# Patient Record
Sex: FEMALE | Race: WHITE | NOT HISPANIC OR LATINO | Employment: FULL TIME | URBAN - METROPOLITAN AREA
[De-identification: names, ages, dates, MRNs, and addresses within clinical notes are randomized per-mention and may not be internally consistent; named-entity substitution may affect disease eponyms.]

---

## 2017-01-16 ENCOUNTER — OFFICE VISIT (OUTPATIENT)
Dept: OBSTETRICS AND GYNECOLOGY | Facility: CLINIC | Age: 42
End: 2017-01-16

## 2017-01-16 VITALS — DIASTOLIC BLOOD PRESSURE: 84 MMHG | WEIGHT: 198 LBS | SYSTOLIC BLOOD PRESSURE: 124 MMHG

## 2017-01-16 DIAGNOSIS — R87.810 ASCUS WITH POSITIVE HIGH RISK HPV CERVICAL: Primary | ICD-10-CM

## 2017-01-16 DIAGNOSIS — R87.610 ASCUS WITH POSITIVE HIGH RISK HPV CERVICAL: Primary | ICD-10-CM

## 2017-01-16 PROCEDURE — 57452 EXAM OF CERVIX W/SCOPE: CPT | Performed by: OBSTETRICS & GYNECOLOGY

## 2017-01-16 NOTE — MR AVS SNAPSHOT
Leanna Benoit   1/16/2017 3:10 PM   Office Visit    Dept Phone:  [phone]   Encounter #:  [id number]    Provider:  Malik Haji MD   Department:  [facility] OBSTETRICS AND GYNECOLOGY                Your Full Care Plan              Your Updated Medication List          This list is accurate as of: 1/16/17  3:46 PM.  Always use your most recent med list.                levonorgestrel 20 MCG/24HR IUD   Commonly known as:  MIRENA               You Were Diagnosed With        Codes Comments    ASCUS with positive high risk HPV cervical    -  Primary ICD-10-CM: R87.610, R87.810  ICD-9-CM: 795.01, 795.05       Instructions     None    Patient Instructions History      Upcoming Appointments     Visit Type Date Time Department    [facility] 1[facility]16[facility]2017  3[facility]    ANNUAL 12/4/2017  9:00 AM [facility]      MyChart Signup     Our records indicate that you have declined [facility] sign[facility]. If you would like to sign up for [email or web address], please email [email or web address] or call [phone] to obtain an activation code.             Other Info from Your Visit           Your Appointments     Dec 04, 2017  9:00 AM EST   Annual with Malik Haji MD   [facility] OBSTETRICS AND GYNECOLOGY (--)    [address] [place] [id number] [phone]              Allergies     No Known Allergies      Reason for Visit     Colposcopy No concern this visit.      Vital Signs     Blood Pressure Weight Smoking Status             124/84 198 lb (89.8 kg) Never Smoker         Problems and Diagnoses Noted     ASCUS with positive high risk HPV cervical

## 2017-01-16 NOTE — PROGRESS NOTES
Procedure   Procedures       Physical Exam    Colposcopy Procedure Note    Indications: Pap smear 2 months ago showed: ASCUS with POSITIVE high risk HPV. The prior pap showed ASCUS with POSITIVE high risk HPV.  Prior cervical/vaginal disease: normal exam without visible pathology. Prior cervical treatment: no treatment.    Procedure Details   The risks and benefits of the procedure and Verbal informed consent obtained.    Speculum placed in vagina and excellent visualization of cervix achieved, cervix swabbed x 3 with acetic acid solution.    Findings:  Cervix: no visible lesions; SCJ visualized 360 degrees without lesions.  Vaginal inspection: vaginal colposcopy not performed.  Vulvar colposcopy: vulvar colposcopy not performed.    Specimens: none    Complications: none.    Plan:  Treatment options discussed with patient.  Re-pap one year        1/16/2017  Malik Haji MD

## 2017-12-04 ENCOUNTER — OFFICE VISIT (OUTPATIENT)
Dept: OBSTETRICS AND GYNECOLOGY | Facility: CLINIC | Age: 42
End: 2017-12-04

## 2017-12-04 VITALS
BODY MASS INDEX: 30.53 KG/M2 | SYSTOLIC BLOOD PRESSURE: 120 MMHG | HEIGHT: 66 IN | WEIGHT: 190 LBS | DIASTOLIC BLOOD PRESSURE: 68 MMHG

## 2017-12-04 DIAGNOSIS — Z00.00 ANNUAL PHYSICAL EXAM: Primary | ICD-10-CM

## 2017-12-04 DIAGNOSIS — Z12.39 BREAST CANCER SCREENING: ICD-10-CM

## 2017-12-04 PROCEDURE — 99396 PREV VISIT EST AGE 40-64: CPT | Performed by: OBSTETRICS & GYNECOLOGY

## 2017-12-04 NOTE — PROGRESS NOTES
"Subjective     Chief Complaint   Patient presents with   • Gynecologic Exam     pap       Leanna Laboy is a 42 y.o. year old  presenting to be seen for her annual exam.      Her LMP was No LMP recorded..  Her cycles are regular, predictable and consistent every 28 - 32 days  infrequent.  Usually her flow is typically light and flow is typically normal with no more than 0 days of heavy flow each menses.   Additionally she describes pelvic pain (absent) associated with her menses.    She is sexually active.  In the past 12 months there have not been new sexual partners.  Condoms are not typically used.  She would not like to be screened for STD's at today's exam.     Current contraceptive methods being used: IUD - Mirena.  In the coming year, she is not considering changing her current contraceptive method.    She exercises regularly: no.  She wears her seat belt: yes.  She has concerns about domestic violence: no.    GYN screening history:  · Last pap: was done on approximately  and the result was: High risk HPV normal cytology..    No Additional Complaints Reported    The following portions of the patient's history were reviewed and updated as appropriate:vital signs, allergies, current medications, past medical history, past social history, past surgical history and problem list.    Review of Systems  Pertinent items are noted in HPI.     Physical Exam    Objective     /68  Ht 66\" (167.6 cm)  Wt 190 lb (86.2 kg)  LMP Comment: mirena IUD  BMI 30.67 kg/m2      General:  well developed; well nourished  no acute distress   Constitutional: healthy   Skin:  No suspicious lesions seen   Thyroid: normal to inspection and palpation   Lungs:  breathing is unlabored  clear to auscultation bilaterally   Heart:  regular rate and rhythm, S1, S2 normal, no murmur, click, rub or gallop   Breasts:  Examined in supine position  Symmetric without masses or skin dimpling  Nipples normal without inversion, lesions or " discharge  There are no palpable axillary nodes SP reduction mammoplasty   Abdomen: soft, non-tender; no masses  no umbilical or inginual hernias are present  no hepato-splenomegaly   Pelvis: Clinical staff was present for exam  External genitalia:  normal appearance of the external genitalia including Bartholin's and Albright's glands.  :  urethral meatus normal; urethral hypermobility is absent.  Vaginal:  normal pink mucosa without prolapse or lesions.  Cervix:  normal appearance IUD string present - 3 cms in length;  Uterus:  normal size, shape and consistency  Adnexa:  normal bimanual exam of the adnexa.   Musculoskeletal: negative   Neuro: normal without focal findings, mental status, speech normal, alert and oriented x3 and HENRRY   Psych: oriented to time, place and person, mood and affect are within normal limits, pt is a good historian; no memory problems were noted       Lab Review   No data reviewed    Imaging  No data reviewed    Assessment/Plan     ASSESSMENT  1. Annual physical exam    2. Breast cancer screening        PLAN  Orders Placed This Encounter   Procedures   • Mammo Screening Digital Tomosynthesis Bilateral With CAD     Standing Status:   Future     Standing Expiration Date:   12/4/2018     Order Specific Question:   Reason for Exam:     Answer:   screen     Order Specific Question:   Patient Pregnant     Answer:   No     No orders of the defined types were placed in this encounter.        Follow up: 1 year(s)         This note was electronically signed.    Malik Haji MD  December 4, 2017

## 2017-12-12 ENCOUNTER — TELEPHONE (OUTPATIENT)
Dept: OBSTETRICS AND GYNECOLOGY | Facility: CLINIC | Age: 42
End: 2017-12-12

## 2017-12-12 RX ORDER — FLUCONAZOLE 150 MG/1
150 TABLET ORAL ONCE
Qty: 1 TABLET | Refills: 0 | Status: SHIPPED | OUTPATIENT
Start: 2017-12-12 | End: 2017-12-12

## 2017-12-12 NOTE — TELEPHONE ENCOUNTER
----- Message from Malik Haji MD sent at 12/11/2017  2:02 PM EST -----  Advise of result. Suggest repeat pap one year  ----- Message -----     From: Michelle Keating     Sent: 12/11/2017   9:55 AM       To: Malik Haji MD        Patient advised

## 2018-07-11 RX ORDER — FLUCONAZOLE 150 MG/1
150 TABLET ORAL DAILY
Qty: 1 TABLET | Refills: 0 | Status: SHIPPED | OUTPATIENT
Start: 2018-07-11 | End: 2018-12-06

## 2018-09-20 ENCOUNTER — HOSPITAL ENCOUNTER (OUTPATIENT)
Dept: MAMMOGRAPHY | Facility: HOSPITAL | Age: 43
Discharge: HOME | End: 2018-09-20
Attending: OBSTETRICS & GYNECOLOGY | Admitting: OBSTETRICS & GYNECOLOGY

## 2018-09-20 DIAGNOSIS — Z12.39 BREAST CANCER SCREENING: ICD-10-CM

## 2018-09-20 PROCEDURE — 77063 BREAST TOMOSYNTHESIS BI: CPT | Performed by: RADIOLOGY

## 2018-09-20 PROCEDURE — 77067 SCR MAMMO BI INCL CAD: CPT

## 2018-09-20 PROCEDURE — 77063 BREAST TOMOSYNTHESIS BI: CPT

## 2018-09-20 PROCEDURE — 77067 SCR MAMMO BI INCL CAD: CPT | Performed by: RADIOLOGY

## 2018-09-21 ENCOUNTER — APPOINTMENT (OUTPATIENT)
Dept: MAMMOGRAPHY | Facility: HOSPITAL | Age: 43
End: 2018-09-21
Attending: OBSTETRICS & GYNECOLOGY

## 2018-10-29 ENCOUNTER — TELEPHONE (OUTPATIENT)
Dept: OBSTETRICS AND GYNECOLOGY | Facility: CLINIC | Age: 43
End: 2018-10-29

## 2018-10-29 RX ORDER — FLUCONAZOLE 150 MG/1
150 TABLET ORAL DAILY
Qty: 1 TABLET | Refills: 1 | Status: SHIPPED | OUTPATIENT
Start: 2018-10-29 | End: 2018-12-06

## 2018-12-06 ENCOUNTER — OFFICE VISIT (OUTPATIENT)
Dept: OBSTETRICS AND GYNECOLOGY | Facility: CLINIC | Age: 43
End: 2018-12-06

## 2018-12-06 VITALS
SYSTOLIC BLOOD PRESSURE: 110 MMHG | WEIGHT: 198 LBS | BODY MASS INDEX: 31.82 KG/M2 | HEIGHT: 66 IN | DIASTOLIC BLOOD PRESSURE: 82 MMHG

## 2018-12-06 DIAGNOSIS — Z01.419 WOMEN'S ANNUAL ROUTINE GYNECOLOGICAL EXAMINATION: Primary | ICD-10-CM

## 2018-12-06 DIAGNOSIS — Z12.39 BREAST CANCER SCREENING: ICD-10-CM

## 2018-12-06 DIAGNOSIS — E04.1 THYROID CYST: ICD-10-CM

## 2018-12-06 PROCEDURE — 99396 PREV VISIT EST AGE 40-64: CPT | Performed by: OBSTETRICS & GYNECOLOGY

## 2018-12-06 NOTE — PROGRESS NOTES
"Subjective     Chief Complaint   Patient presents with   • Gynecologic Exam     annual pap       Leannasea Laboy is a 43 y.o. year old  presenting to be seen for her annual exam.      Her LMP was No LMP recorded. Patient has had an implant..  Her cycles are absent. Additionally she describes no other symptoms associated with her menses.    She is sexually active.  In the past 12 months there have been new sexual partners.  Condoms are not typically used.  She would like to be screened for STD's at today's exam.     Current contraceptive methods being used: IUD - Mirena.  In the coming year, she is not considering changing her current contraceptive method.    She exercises regularly: no.  She wears her seat belt: yes.  She has concerns about domestic violence: no.    GYN screening history:  · Last pap: she reports her last PAP was normal  · Last mammogram: she reports her last mammogram was normal.    No Additional Complaints Reported    The following portions of the patient's history were reviewed and updated as appropriate:vital signs, allergies, current medications, past medical history, past social history, past surgical history and problem list.    Review of Systems  Pertinent items are noted in HPI.     Physical Exam    Objective     /82   Ht 167.6 cm (66\")   Wt 89.8 kg (198 lb)   Breastfeeding? No   BMI 31.96 kg/m²       General:  well developed; well nourished  no acute distress   Constitutional: obese and healthy   Skin:  No suspicious lesions seen   Thyroid: solitary nodule 2cm, smooth mobile non-tender L lobe   Lungs:  breathing is unlabored  clear to auscultation bilaterally   Heart:  regular rate and rhythm, S1, S2 normal, no murmur, click, rub or gallop   Breasts:  Examined in supine position  Symmetric without masses or skin dimpling  Nipples normal without inversion, lesions or discharge  There are no palpable axillary nodes  reduction mammoplasty scars stable   Abdomen: soft, " non-tender; no masses  no umbilical or inginual hernias are present  no hepato-splenomegaly   Pelvis: Clinical staff was present for exam  External genitalia:  normal appearance of the external genitalia including Bartholin's and [address]'s glands.  :  urethral meatus normal; urethral hypermobility is absent.  Vaginal:  normal pink mucosa without prolapse or lesions.  Cervix:  normal appearance IUD string present - 3 cms in length;  Uterus:  normal size, shape and consistency  Adnexa:  normal bimanual exam of the adnexa.   Musculoskeletal: negative   Neuro: normal without focal findings, mental status, speech normal, alert and oriented x3 and HENRRY   Psych: oriented to time, place and person, mood and affect are within normal limits, pt is a good historian; no memory problems were noted       Lab Review   No data reviewed    Imaging  No data reviewed    Assessment/Plan     ASSESSMENT  1. Women's annual routine gynecological examination    2. Breast cancer screening    3. BMI 32.0-32.9,adult    4. Thyroid cyst        PLAN  Orders Placed This Encounter   Procedures   • Mammo Screening Digital Tomosynthesis Bilateral With CAD     Standing Status:   Future     Standing Expiration Date:   12/6/2019     Order Specific Question:   Reason for Exam:     Answer:   screen     Order Specific Question:   Patient Pregnant     Answer:   No     No orders of the defined types were placed in this encounter.        Follow up: 1 year(s)   Thyroid cyst evaluation coordinated by PCP         This note was electronically signed.    Malik Haji MD  December 6, 2018

## 2018-12-12 ENCOUNTER — TELEPHONE (OUTPATIENT)
Dept: OBSTETRICS AND GYNECOLOGY | Facility: CLINIC | Age: 43
End: 2018-12-12

## 2018-12-12 NOTE — TELEPHONE ENCOUNTER
----- Message from Malik Haji MD sent at 12/12/2018 12:32 PM EST -----      ----- Message -----  From: Michelle Keating  Sent: 12/12/2018   9:58 AM  To: Malik Haji MD

## 2020-02-07 ENCOUNTER — TRANSCRIBE ORDERS (OUTPATIENT)
Dept: LAB | Facility: HOSPITAL | Age: 45
End: 2020-02-07

## 2020-02-07 ENCOUNTER — LAB (OUTPATIENT)
Dept: LAB | Facility: HOSPITAL | Age: 45
End: 2020-02-07

## 2020-02-07 DIAGNOSIS — C73 MALIGNANT NEOPLASM OF THYROID GLAND (HCC): ICD-10-CM

## 2020-02-07 DIAGNOSIS — C73 MALIGNANT NEOPLASM OF THYROID GLAND (HCC): Primary | ICD-10-CM

## 2020-02-07 LAB
T3FREE SERPL-MCNC: 3.21 PG/ML (ref 2–4.4)
TSH SERPL DL<=0.05 MIU/L-ACNC: 2.92 UIU/ML (ref 0.27–4.2)

## 2020-02-07 PROCEDURE — 84443 ASSAY THYROID STIM HORMONE: CPT

## 2020-02-07 PROCEDURE — 36415 COLL VENOUS BLD VENIPUNCTURE: CPT

## 2020-02-07 PROCEDURE — 84481 FREE ASSAY (FT-3): CPT

## 2020-04-06 ENCOUNTER — TELEPHONE (OUTPATIENT)
Dept: ENDOCRINOLOGY | Facility: CLINIC | Age: 45
End: 2020-04-06

## 2020-04-06 NOTE — TELEPHONE ENCOUNTER
LVM for patient to call back to schedule NP mychart video visit or telephone call visit sometime Wednesday or Thursday of this week with Dr. Jimenez.

## 2020-04-06 NOTE — TELEPHONE ENCOUNTER
----- Message from Michelle Jimenez DO sent at 4/6/2020 10:43 AM EDT -----  Regarding: appt  Can you also schedule this pt for a new pt appt next Wednesday or Thursday for telemedicine? Her visit was canceled last week but she will need to be seen sooner rather than later. Thank you!

## 2020-04-09 ENCOUNTER — OFFICE VISIT (OUTPATIENT)
Dept: ENDOCRINOLOGY | Facility: CLINIC | Age: 45
End: 2020-04-09

## 2020-04-09 VITALS — BODY MASS INDEX: 30.61 KG/M2 | HEIGHT: 67 IN | WEIGHT: 195 LBS

## 2020-04-09 DIAGNOSIS — C73 FOLLICULAR THYROID CANCER (HCC): Primary | ICD-10-CM

## 2020-04-09 PROCEDURE — 99203 OFFICE O/P NEW LOW 30 MIN: CPT | Performed by: INTERNAL MEDICINE

## 2020-04-09 RX ORDER — LEVOTHYROXINE SODIUM 0.05 MG/1
50 TABLET ORAL DAILY
Qty: 30 TABLET | Refills: 5 | Status: SHIPPED | OUTPATIENT
Start: 2020-04-09 | End: 2020-06-29 | Stop reason: SDUPTHER

## 2020-04-09 NOTE — PROGRESS NOTES
Chief Complaint   Patient presents with   • Thyroid Problem     New Patient for Thyroid Cancer        Referring Provider  Herve Mccrary MD     DION Laboy is a 44 y.o. female had concerns including Thyroid Problem (New Patient for Thyroid Cancer).     This visit has been rescheduled as a phone visit to comply with patient safety concerns in accordance with CDC recommendations. Total time of discussion was 14 minutes.    Pt noted a thyroid nodule she noted that started protruding over 6 months.   She had an US and biopsy and underwent left hemithyroidectomy on 2020. 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     She was scheduled for completion thyroidectomy though due to COVID-19 this has been delayed.     Pt is feeling well post-op. No different than prior to partial thyroidectomy.     No family history of thyroid cancer. No personal history of radiation therapy to the neck or chest.     Past Medical History:   Diagnosis Date   • Thyroid cancer (CMS/HCC)      Past Surgical History:   Procedure Laterality Date   • BILATERAL BREAST REDUCTION     •  SECTION     • REDUCTION MAMMAPLASTY     • THYROIDECTOMY, PARTIAL Left       Family History   Problem Relation Age of Onset   • Hypertension Mother    • Thyroid disease Mother    • Skin cancer Maternal Grandmother    • Pancreatic cancer Maternal Grandfather    • No Known Problems Father    • Breast cancer Neg Hx    • Ovarian cancer Neg Hx       Social History     Socioeconomic History   • Marital status:      Spouse name: Not on file   • Number of children: Not on file   • Years of education: Not on file   • Highest education level: Not on file   Tobacco Use   • Smoking status: Former Smoker   • Smokeless tobacco: Never Used   Substance and Sexual Activity   • Alcohol use: No   • Drug use: Never      No Known Allergies   Current Outpatient Medications on File Prior to Visit   Medication  "Sig Dispense Refill   • levonorgestrel (MIRENA) 20 MCG/24HR IUD 1 each by Intrauterine route 1 (one) time.       No current facility-administered medications on file prior to visit.         Review of Systems   Constitutional: Negative.    HENT: Positive for congestion. Negative for sinus pressure.    Eyes: Negative.    Respiratory: Positive for cough. Negative for shortness of breath.    Cardiovascular: Negative.    Gastrointestinal: Positive for abdominal pain. Negative for constipation.   Endocrine: Negative.    Genitourinary: Negative.    Musculoskeletal: Positive for myalgias. Negative for neck pain.   Skin: Negative.    Allergic/Immunologic: Negative.    Neurological: Positive for numbness. Negative for headache.   Hematological: Negative.    Psychiatric/Behavioral: Negative.       ROS was reviewed and verified. All other ROS negative.    Ht 170.2 cm (67\")   Wt 88.5 kg (195 lb)   Breastfeeding No   BMI 30.54 kg/m²       Physical Exam    Constitutional:  no acute distress   ENT/Thyroid: not examined   Eyes: not examined   Respiratory:  No conversational dypsnea   Cardiovascular:  Not performed.   Chest:  Not performed.   Abdomen: Not performed.   : Not performed.   Musculoskeletal: Not examined   Skin: not performed.   Neuro: mental status, speech normal, alert and oriented x3   Psych: oriented to time, place and person, mood and affect are within normal limits     Labs/Imaging  TSH  Lab Results   Component Value Date    TSH 2.920 02/07/2020     T3  Lab Results   Component Value Date    T3FREE 3.21 02/07/2020       Assessment and Plan    Leanna was seen today for thyroid problem.    Diagnoses and all orders for this visit:    Follicular thyroid cancer (CMS/HCC)  Left hemithyroidectomy completed on 1/14/20 showing a minimally invasive but encapsulated follicular carcinoma.   She reports also having nodules on the right side and is scheduled for a completion thyroidectomy within the next several months. "   Thyroid ultrasound, biopsy results, op report and original surgical pathology have been requested.   This is preliminarily looking like a low risk thyroid cancer and target TSH for management is <2.   Initiate levothyroxine 50 mcg daily and recheck TFTs in 6 weeks. If pt has completion thyroidectomy - her dose will need to be increased. She was advised to notify our office when surgery is scheduled.   The potential need for ARANDA will be determined based on the final path report after completion thyroidectomy - based on the information as of today, ARANDA may not be necessary.  -     levothyroxine (Synthroid) 50 MCG tablet; Take 1 tablet by mouth Daily.  -     T4, Free; Future  -     TSH; Future    **Addendum 4/16/2020:    Additional records received.  Ultrasound from 10/4/2019 confirmed a right 9 x 8 x 8 mm thyroid nodule and left lobe nodule measured 4.4 x 3.1 x 3.0 cm.  The original surgical pathology noted a well differentiated encapsulated follicular neoplasm measuring 4.2 x 4.0 x 3.0 cm with focal capsular invasion and no extrathyroidal extension.  Inked surgical margins were negative for tumor.  No lymph nodes were submitted.    Return in about 3 months (around 7/9/2020) for next scheduled follow up. The patient was instructed to contact the clinic with any interval questions or concerns.    Michelle Jimenez, DO   Endocrinologist    Please note that portions of this document were completed with a voice recognition program. Efforts were made to edit the dictations, but occasionally words are mis-transcribed.

## 2020-06-26 ENCOUNTER — APPOINTMENT (OUTPATIENT)
Dept: PREADMISSION TESTING | Facility: HOSPITAL | Age: 45
End: 2020-06-26

## 2020-06-26 PROCEDURE — U0002 COVID-19 LAB TEST NON-CDC: HCPCS

## 2020-06-26 PROCEDURE — C9803 HOPD COVID-19 SPEC COLLECT: HCPCS

## 2020-06-26 PROCEDURE — U0004 COV-19 TEST NON-CDC HGH THRU: HCPCS

## 2020-06-27 LAB
REF LAB TEST METHOD: NORMAL
SARS-COV-2 RNA RESP QL NAA+PROBE: NOT DETECTED

## 2020-06-29 ENCOUNTER — TELEPHONE (OUTPATIENT)
Dept: ENDOCRINOLOGY | Facility: CLINIC | Age: 45
End: 2020-06-29

## 2020-06-29 DIAGNOSIS — C73 FOLLICULAR THYROID CANCER (HCC): ICD-10-CM

## 2020-06-29 RX ORDER — LEVOTHYROXINE SODIUM 137 UG/1
137 TABLET ORAL DAILY
Qty: 30 TABLET | Refills: 5 | Status: SHIPPED | OUTPATIENT
Start: 2020-06-29 | End: 2020-08-17 | Stop reason: SDUPTHER

## 2020-06-29 NOTE — TELEPHONE ENCOUNTER
PATIENT IS SCHEDULED TO HAVE SURGERY ON 6/30/2020 AND WANTS TO KNNOW IF SHE NEEDS TO KEEP OR R/S HER UPCOMING APPT ON 7/9/2020.    CALL BACK [phone]

## 2020-06-29 NOTE — TELEPHONE ENCOUNTER
I would delay the visit for 6 weeks from now - when she will be due for repeat thyroid levels. Dr. Mccrary reached out to our office and I'm recommending a dose increase in her thyroid medication to 137 mcg daily - which she should start after surgery. I'll recheck levels a few days before her follow-up 6 weeks from now.

## 2020-06-30 ENCOUNTER — LAB REQUISITION (OUTPATIENT)
Dept: LAB | Facility: HOSPITAL | Age: 45
End: 2020-06-30

## 2020-06-30 DIAGNOSIS — C73 MALIGNANT NEOPLASM OF THYROID GLAND (HCC): ICD-10-CM

## 2020-06-30 PROCEDURE — 88307 TISSUE EXAM BY PATHOLOGIST: CPT | Performed by: OTOLARYNGOLOGY

## 2020-06-30 NOTE — TELEPHONE ENCOUNTER
LVM for patient to concerning thyroid medication and appt. Office number was given for patient to call back for information.

## 2020-07-01 LAB
CYTO UR: NORMAL
LAB AP CASE REPORT: NORMAL
LAB AP CLINICAL INFORMATION: NORMAL
PATH REPORT.FINAL DX SPEC: NORMAL
PATH REPORT.GROSS SPEC: NORMAL

## 2020-08-17 ENCOUNTER — LAB (OUTPATIENT)
Dept: LAB | Facility: HOSPITAL | Age: 45
End: 2020-08-17

## 2020-08-17 ENCOUNTER — OFFICE VISIT (OUTPATIENT)
Dept: ENDOCRINOLOGY | Facility: CLINIC | Age: 45
End: 2020-08-17

## 2020-08-17 VITALS
HEART RATE: 63 BPM | HEIGHT: 67 IN | WEIGHT: 197.6 LBS | SYSTOLIC BLOOD PRESSURE: 112 MMHG | OXYGEN SATURATION: 97 % | DIASTOLIC BLOOD PRESSURE: 82 MMHG | BODY MASS INDEX: 31.01 KG/M2

## 2020-08-17 DIAGNOSIS — E89.0 POSTOPERATIVE HYPOTHYROIDISM: Primary | ICD-10-CM

## 2020-08-17 DIAGNOSIS — C73 FOLLICULAR THYROID CANCER (HCC): Primary | ICD-10-CM

## 2020-08-17 DIAGNOSIS — E89.0 POSTOPERATIVE HYPOTHYROIDISM: ICD-10-CM

## 2020-08-17 DIAGNOSIS — C73 FOLLICULAR THYROID CANCER (HCC): ICD-10-CM

## 2020-08-17 PROBLEM — E04.1 THYROID CYST: Status: RESOLVED | Noted: 2018-12-06 | Resolved: 2020-08-17

## 2020-08-17 LAB
T4 FREE SERPL-MCNC: 2.23 NG/DL (ref 0.93–1.7)
TSH SERPL DL<=0.05 MIU/L-ACNC: 0.11 UIU/ML (ref 0.27–4.2)

## 2020-08-17 PROCEDURE — 84443 ASSAY THYROID STIM HORMONE: CPT | Performed by: INTERNAL MEDICINE

## 2020-08-17 PROCEDURE — 99214 OFFICE O/P EST MOD 30 MIN: CPT | Performed by: INTERNAL MEDICINE

## 2020-08-17 PROCEDURE — 84439 ASSAY OF FREE THYROXINE: CPT | Performed by: INTERNAL MEDICINE

## 2020-08-17 PROCEDURE — 84432 ASSAY OF THYROGLOBULIN: CPT | Performed by: INTERNAL MEDICINE

## 2020-08-17 PROCEDURE — 86800 THYROGLOBULIN ANTIBODY: CPT | Performed by: INTERNAL MEDICINE

## 2020-08-17 RX ORDER — LEVOTHYROXINE SODIUM 0.12 MG/1
125 TABLET ORAL DAILY
Qty: 30 TABLET | Refills: 5 | Status: SHIPPED | OUTPATIENT
Start: 2020-08-17 | End: 2020-12-29 | Stop reason: SDUPTHER

## 2020-08-17 NOTE — PROGRESS NOTES
"Chief Complaint   Patient presents with   • Thyroid Problem     Follow up Thyroid Cancer        HPI   Leanna Laboy is a 44 y.o. female had concerns including Thyroid Problem (Follow up Thyroid Cancer).      Patient underwent initial surgery for left thyroid nodule on 1/14/2020 and showed a minimally invasive but well differentiated 4.2 x 4.0 x 3.0 cm follicular thyroid carcinoma with positive focal capsular invasion, no extrathyroidal extension, negative margins.  No lymph nodes were submitted.  She underwent completion thyroidectomy on 6/30/2020 and the right lobe was benign.  No parathyroid tissue was detected.    She is now on levothyroxine 137 mcg daily and feeling mostly well.  Complains of occasional fatigue but otherwise no new concerns or complaints.  She has not had TFTs checked.    She takes levothyroxine first thing in the morning, empty stomach, separate from other meds, at least 30 minutes prior to eating or drinking.  Denies any missed doses.    The following portions of the patient's history were reviewed and updated as appropriate: allergies, current medications, past family history, past medical history, past social history, past surgical history and problem list.    Review of Systems   Constitutional: Negative.    HENT: Negative.    Eyes: Negative.    Respiratory: Negative.    Cardiovascular: Negative.    Gastrointestinal: Negative.    Endocrine: Negative.    Genitourinary: Negative.    Musculoskeletal: Negative.    Skin: Negative.    Allergic/Immunologic: Negative.    Neurological: Negative.    Hematological: Negative.    Psychiatric/Behavioral: Negative.       ROS was reviewed and verified. All other ROS negative.    /82 (BP Location: Left arm, Patient Position: Sitting, Cuff Size: Adult)   Pulse 63   Ht 170.2 cm (67\")   Wt 89.6 kg (197 lb 9.6 oz)   SpO2 97%   Breastfeeding No   BMI 30.95 kg/m²      Physical Exam     Constitutional:  well developed; well nourished  no acute " distress   ENT/Thyroid:  Thyroid surgically absent, no lymphadenopathy, well-healing scar at the base of the neck   Eyes: EOM intact  Conjunctiva: clear   Respiratory:  breathing is unlabored  clear to auscultation bilaterally   Cardiovascular:  regular rate and rhythm, S1, S2 normal, no murmur, click, rub or gallop   Chest:  Not performed.   Abdomen: Not performed.   : Not performed.   Musculoskeletal: negative findings:  ROM of all joints is normal, no deformities present   Skin: dry and warm   Neuro: normal without focal findings and mental status, speech normal, alert and oriented x3   Psych: oriented to time, place and person, mood and affect are within normal limits       TSH  Lab Results   Component Value Date    TSH 2.920 02/07/2020     T3  Lab Results   Component Value Date    T3FREE 3.21 02/07/2020       Assessment and Plan    Leanna was seen today for thyroid problem.    Diagnoses and all orders for this visit:    Follicular thyroid cancer (CMS/HCC)  Left lobectomy 1/14/2020 showing minimally invasive but well differentiated 4.2 x 4.0 x 3.0 cm follicular thyroid carcinoma with positive focal capsular invasion, no extrathyroidal extension, negative margins.  No lymph nodes were submitted. Completion thyroidectomy on 6/30/2020 and the right lobe was benign. No ARANDA postop indicated or administered.   Check TG levels today.   US at follow-up visit.  -     Thyroglobulin With Anti-TG    Postoperative hypothyroidism  Target TSH less than 2 based on low risk follicular thyroid cancer.   Currently euthyroid on levothyroxine 137 mcg daily.  Check TFTs today and titrate levothyroxine if needed.  -     T4, Free  -     TSH         Return in about 6 months (around 2/17/2021) for next scheduled follow up with ultrasound. The patient was instructed to contact the clinic with any interval questions or concerns.    Michelle Jimenez, DO   Endocrinologist    Please note that portions of this document were completed with a  voice recognition program. Efforts were made to edit the dictations, but occasionally words are mis-transcribed.

## 2020-08-18 LAB
THYROGLOB AB SERPL-ACNC: <1 IU/ML (ref 0–0.9)
THYROGLOBULIN BY IMA: 0.8 NG/ML (ref 1.5–38.5)

## 2020-12-29 ENCOUNTER — TELEPHONE (OUTPATIENT)
Dept: ENDOCRINOLOGY | Facility: CLINIC | Age: 45
End: 2020-12-29

## 2020-12-29 DIAGNOSIS — E89.0 POSTOPERATIVE HYPOTHYROIDISM: ICD-10-CM

## 2020-12-29 RX ORDER — LEVOTHYROXINE SODIUM 112 UG/1
112 TABLET ORAL DAILY
Qty: 30 TABLET | Refills: 1 | Status: SHIPPED | OUTPATIENT
Start: 2020-12-29 | End: 2021-03-02 | Stop reason: SDUPTHER

## 2020-12-29 NOTE — TELEPHONE ENCOUNTER
Please let the patient know that I received her labs from 12/14/2020 and shows her TSH is suppressed at 0.1.  This indicates that she needs a dose reduction in the levothyroxine.  Recommend that she decrease the dose to 112 mcg daily and recheck the labs a few days before her follow-up visit in February.  I put a lab order in the system.

## 2021-02-25 ENCOUNTER — LAB (OUTPATIENT)
Dept: LAB | Facility: HOSPITAL | Age: 46
End: 2021-02-25

## 2021-02-25 ENCOUNTER — OFFICE VISIT (OUTPATIENT)
Dept: ENDOCRINOLOGY | Facility: CLINIC | Age: 46
End: 2021-02-25

## 2021-02-25 VITALS
TEMPERATURE: 98.2 F | DIASTOLIC BLOOD PRESSURE: 70 MMHG | WEIGHT: 198.4 LBS | HEIGHT: 66 IN | SYSTOLIC BLOOD PRESSURE: 112 MMHG | BODY MASS INDEX: 31.88 KG/M2

## 2021-02-25 DIAGNOSIS — C73 FOLLICULAR THYROID CANCER (HCC): Primary | ICD-10-CM

## 2021-02-25 DIAGNOSIS — E89.0 POSTOPERATIVE HYPOTHYROIDISM: ICD-10-CM

## 2021-02-25 DIAGNOSIS — C73 FOLLICULAR THYROID CANCER (HCC): ICD-10-CM

## 2021-02-25 LAB
T4 FREE SERPL-MCNC: 1.69 NG/DL (ref 0.93–1.7)
TSH SERPL DL<=0.05 MIU/L-ACNC: 0.13 UIU/ML (ref 0.27–4.2)

## 2021-02-25 PROCEDURE — 84443 ASSAY THYROID STIM HORMONE: CPT

## 2021-02-25 PROCEDURE — 76536 US EXAM OF HEAD AND NECK: CPT | Performed by: INTERNAL MEDICINE

## 2021-02-25 PROCEDURE — 84439 ASSAY OF FREE THYROXINE: CPT

## 2021-02-25 PROCEDURE — 99214 OFFICE O/P EST MOD 30 MIN: CPT | Performed by: INTERNAL MEDICINE

## 2021-02-25 NOTE — PROGRESS NOTES
"Chief Complaint   Patient presents with   • Follow-up   • Thyroid Problem        HPI   Leanna Laboy is a 45 y.o. female had concerns including Follow-up and Thyroid Problem.      Here today for follow-up on hypothyroidism and history of thyroid cancer.  Dose of levothyroxine was decreased to 112 mcg daily for suppressed TSH at 0.106 in December.  She is due for repeat TFTs today.    The following portions of the patient's history were reviewed and updated as appropriate: allergies, current medications, past family history, past medical history, past social history, past surgical history and problem list.    Review of Systems   Constitutional: Positive for fatigue.        /70   Temp 98.2 °F (36.8 °C) (Infrared)   Ht 167.6 cm (66\")   Wt 90 kg (198 lb 6.4 oz)   BMI 32.02 kg/m²      Physical Exam  Vitals signs reviewed.   Constitutional:       Appearance: Normal appearance.   Pulmonary:      Effort: Pulmonary effort is normal.   Neurological:      Mental Status: She is alert.   Psychiatric:         Mood and Affect: Mood normal.         Behavior: Behavior normal.        TSH  Lab Results   Component Value Date    TSH 0.107 (L) 2020    TSH 2.920 2020       T4  Lab Results   Component Value Date    FREET4 2.23 (H) 2020       T3  Lab Results   Component Value Date    T3FREE 3.21 2020     Lab Results   Component Value Date    THYROGLOBULN 0.8 (L) 2020       Lab Results   Component Value Date    THGAB <1.0 2020 TSH 0.106        Post-operative Neck Ultrasound  [facility]    PT: Leanna Laboy  : 1975  Date:  21  Indication: History of thyroid cancer.    Technique: Real time high resolution imaging of the anterior neck was performed in longitudinal and transverse planes.    Findings:   Compartment / Level I  (Submandibular): no abnormal lymph nodules bilaterally  Compartment / Level II  (Suprahyoid parajugular): no abnormal lymph " nodules bilaterally  Compartment / Level III  (Infrahyoid supracricoid parajugular): no abnormal lymph nodules bilaterally  Compartment / Level IV (Infracricoid parajugular): no abnormal lymph nodules bilaterally  Compartment / Level V (Posterior cervical triangle): no abnormal lymph nodules bilaterally  Compartment / Level VI (Central compartment): no abnormal lymph nodules identified  Compartment / Level VII (Substernal space): no abnormal lymph nodules bilaterally    Impression: The structures of the neck are shifted medially as expected post thyroidectomy. The thyroid bed is unremarkable.   No abnormal lymph nodes were identified in surgical anatomic compartments I - VII.      Recommendation: Repeat Neck US in 1 year.          Assessment and Plan    Diagnoses and all orders for this visit:    1. Follicular thyroid cancer (CMS/HCC) (Primary)  Left lobectomy 1/14/2020 showed a minimally invasive but well differentiated 4.2 x 4.0 x 3.0 cm follicular thyroid carcinoma with positive focal capsular invasion, no extrathyroidal extension, negative margins.  No lymph nodes were submitted. Completion thyroidectomy on 6/30/2020 and the right lobe was benign. No ARANDA was administered.  Neck US today shows no remnant thyroid tissue and no suspicious lymph nodes.  Thyroglobulin level was 0.8 when checked 2 months post completion thyroidectomy.  Recheck TG levels at follow-up visit.  TG antibody was negative.  -     US Thyroid    2. Postoperative hypothyroidism  Target TSH less than 2 due to history of thyroid cancer.  Check TFTs today and titrate levothyroxine if needed.  Dose was last decreased in December for TSH suppressed at 0.106.  Patient complains of some fatigue today.  Currently she is on levothyroxine 112 mcg daily.  -    T4, Free  -     TSH         Return in about 6 months (around 8/25/2021) for next scheduled follow up. The patient was instructed to contact the clinic with any interval questions or  concerns.    Michelle Jimenez, DO   Endocrinologist    Please note that portions of this document were completed with a voice recognition program. Efforts were made to edit the dictations, but occasionally words are mis-transcribed.

## 2021-03-02 DIAGNOSIS — E89.0 POSTOPERATIVE HYPOTHYROIDISM: ICD-10-CM

## 2021-03-02 RX ORDER — LEVOTHYROXINE SODIUM 0.1 MG/1
100 TABLET ORAL DAILY
Qty: 30 TABLET | Refills: 2 | Status: SHIPPED | OUTPATIENT
Start: 2021-03-02 | End: 2021-05-25

## 2021-05-25 DIAGNOSIS — E89.0 POSTOPERATIVE HYPOTHYROIDISM: ICD-10-CM

## 2021-05-28 ENCOUNTER — LAB (OUTPATIENT)
Dept: LAB | Facility: HOSPITAL | Age: 46
End: 2021-05-28

## 2021-05-28 ENCOUNTER — LAB (OUTPATIENT)
Dept: ENDOCRINOLOGY | Facility: CLINIC | Age: 46
End: 2021-05-28

## 2021-05-28 DIAGNOSIS — E89.0 POSTOPERATIVE HYPOTHYROIDISM: ICD-10-CM

## 2021-05-28 LAB
T4 FREE SERPL-MCNC: 1.21 NG/DL (ref 0.93–1.7)
TSH SERPL DL<=0.05 MIU/L-ACNC: 3.99 UIU/ML (ref 0.27–4.2)

## 2021-05-28 PROCEDURE — 84439 ASSAY OF FREE THYROXINE: CPT

## 2021-05-28 PROCEDURE — 84443 ASSAY THYROID STIM HORMONE: CPT

## 2021-05-28 RX ORDER — LEVOTHYROXINE SODIUM 100 UG/1
TABLET ORAL
Qty: 30 TABLET | Refills: 0 | Status: SHIPPED | OUTPATIENT
Start: 2021-05-28 | End: 2021-06-01 | Stop reason: SDUPTHER

## 2021-05-28 NOTE — TELEPHONE ENCOUNTER
PATIENT CALLED TO SEE IF LAB RESULTS ARE BACK. ADVISED PATIENT THAT THE RESULTS ARE NOT AVAILABLE YET. SHE STATES THAT SHE WILL BE OUT OF TOWN NEXT WEEK AND WANTS TO BE ABLE TO  REFILL BEFORE LEAVING TOWN AS SHE WILL NOT HAVE ENOUGH MEDICATION. LABS WERE DRAWN THIS MORNING.     CALL BACK [phone]

## 2021-05-28 NOTE — TELEPHONE ENCOUNTER
CALLED AND SPOKE WITH PT. AND ADVISED LABS WILL PROBABLY NOT BE BACK UNTIL TOMORROW AND Dr. PÉREZ WILL NOT REVIEW THEM UNTIL Tuesday, SHE ASKED IF WE COULD JUST SEND 30 DAY SUPPLY SO SHE DOES NOT RUN OUT PRIOR TO THAT. MIGUEL

## 2021-06-01 DIAGNOSIS — E89.0 POSTOPERATIVE HYPOTHYROIDISM: ICD-10-CM

## 2021-06-01 RX ORDER — LEVOTHYROXINE SODIUM 112 UG/1
112 TABLET ORAL EVERY OTHER DAY
Qty: 15 TABLET | Refills: 5 | Status: SHIPPED | OUTPATIENT
Start: 2021-06-01 | End: 2021-07-30 | Stop reason: SDUPTHER

## 2021-06-01 RX ORDER — LEVOTHYROXINE SODIUM 0.1 MG/1
100 TABLET ORAL EVERY OTHER DAY
Qty: 15 TABLET | Refills: 5 | Status: SHIPPED | OUTPATIENT
Start: 2021-06-01 | End: 2021-07-30 | Stop reason: SDUPTHER

## 2021-07-30 DIAGNOSIS — E89.0 POSTOPERATIVE HYPOTHYROIDISM: ICD-10-CM

## 2021-08-02 RX ORDER — LEVOTHYROXINE SODIUM 112 UG/1
112 TABLET ORAL EVERY OTHER DAY
Qty: 15 TABLET | Refills: 1 | Status: SHIPPED | OUTPATIENT
Start: 2021-08-02 | End: 2021-08-30 | Stop reason: SDUPTHER

## 2021-08-02 RX ORDER — LEVOTHYROXINE SODIUM 0.1 MG/1
100 TABLET ORAL EVERY OTHER DAY
Qty: 15 TABLET | Refills: 1 | Status: SHIPPED | OUTPATIENT
Start: 2021-08-02 | End: 2021-08-30

## 2021-08-26 ENCOUNTER — OFFICE VISIT (OUTPATIENT)
Dept: ENDOCRINOLOGY | Facility: CLINIC | Age: 46
End: 2021-08-26

## 2021-08-26 ENCOUNTER — LAB (OUTPATIENT)
Dept: LAB | Facility: HOSPITAL | Age: 46
End: 2021-08-26

## 2021-08-26 VITALS
OXYGEN SATURATION: 100 % | WEIGHT: 202 LBS | HEART RATE: 75 BPM | HEIGHT: 66 IN | DIASTOLIC BLOOD PRESSURE: 60 MMHG | SYSTOLIC BLOOD PRESSURE: 118 MMHG | BODY MASS INDEX: 32.47 KG/M2

## 2021-08-26 DIAGNOSIS — C73 FOLLICULAR THYROID CANCER (HCC): Primary | ICD-10-CM

## 2021-08-26 DIAGNOSIS — E89.0 POSTOPERATIVE HYPOTHYROIDISM: ICD-10-CM

## 2021-08-26 PROCEDURE — 99214 OFFICE O/P EST MOD 30 MIN: CPT | Performed by: INTERNAL MEDICINE

## 2021-08-26 PROCEDURE — 86800 THYROGLOBULIN ANTIBODY: CPT | Performed by: INTERNAL MEDICINE

## 2021-08-26 PROCEDURE — 84443 ASSAY THYROID STIM HORMONE: CPT | Performed by: INTERNAL MEDICINE

## 2021-08-26 PROCEDURE — 84439 ASSAY OF FREE THYROXINE: CPT | Performed by: INTERNAL MEDICINE

## 2021-08-26 NOTE — PROGRESS NOTES
"Chief Complaint   Patient presents with   • Thyroid Cancer        HPI   Leanna Laboy is a 45 y.o. female had concerns including Thyroid Cancer.      Here for follow-up on hypothyroidism status post thyroidectomy for thyroid cancer.    Currently the patient is on levothyroxine 100 mcg alternating with 112 mcg every other day.  Notes that fatigue seems improved.  Dose was last changed in May for TSH elevated at 3.99.  TSH was suppressed on 112 mcg daily.    The following portions of the patient's history were reviewed and updated as appropriate: allergies, current medications, past family history, past medical history, past social history, past surgical history and problem list.    Review of Systems   Constitutional: Negative.    Endocrine: Negative.         /60 (BP Location: Left arm, Patient Position: Sitting, Cuff Size: Adult)   Pulse 75   Ht 167.6 cm (66\")   Wt 91.6 kg (202 lb)   SpO2 100%   BMI 32.60 kg/m²      Physical Exam  Vitals reviewed.   Constitutional:       Appearance: Normal appearance.   Neck:      Thyroid: No thyroid mass or thyromegaly.      Comments: Thyroid surgically absent  Cardiovascular:      Rate and Rhythm: Normal rate.   Pulmonary:      Effort: Pulmonary effort is normal.   Lymphadenopathy:      Cervical: No cervical adenopathy.   Neurological:      General: No focal deficit present.      Mental Status: She is alert. Mental status is at baseline.   Psychiatric:         Mood and Affect: Mood normal.         Behavior: Behavior normal.              TSH  Lab Results   Component Value Date    TSH 3.990 05/28/2021    TSH 0.132 (L) 02/25/2021    TSH 0.107 (L) 08/17/2020       T4  Lab Results   Component Value Date    FREET4 1.21 05/28/2021    FREET4 1.69 02/25/2021    FREET4 2.23 (H) 08/17/2020     T3  Lab Results   Component Value Date    T3FREE 3.21 02/07/2020     Lab Results   Component Value Date    THYROGLOBULN 0.8 (L) 08/17/2020       Lab Results   Component Value Date    THGAB " <1.0 2020     Post-operative Neck Ultrasound  [facility]     PT: Leanna Laboy  : 1975  Date:  21  Indication: History of thyroid cancer.     Technique: Real time high resolution imaging of the anterior neck was performed in longitudinal and transverse planes.     Findings:   Compartment / Level I  (Submandibular): no abnormal lymph nodules bilaterally  Compartment / Level II  (Suprahyoid parajugular): no abnormal lymph nodules bilaterally  Compartment / Level III  (Infrahyoid supracricoid parajugular): no abnormal lymph nodules bilaterally  Compartment / Level IV (Infracricoid parajugular): no abnormal lymph nodules bilaterally  Compartment / Level V (Posterior cervical triangle): no abnormal lymph nodules bilaterally  Compartment / Level VI (Central compartment): no abnormal lymph nodules identified  Compartment / Level VII (Substernal space): no abnormal lymph nodules bilaterally     Impression: The structures of the neck are shifted medially as expected post thyroidectomy. The thyroid bed is unremarkable.   No abnormal lymph nodes were identified in surgical anatomic compartments I - VII.       Recommendation: Repeat Neck US in 1 year.     Assessment and Plan    Diagnoses and all orders for this visit:    1. Follicular thyroid cancer (CMS/HCC) (Primary)  Left lobectomy 2020 showed a minimally invasive, well differentiated 4.2 x 4.0 x 3.0 cm follicular thyroid carcinoma with microscopic focal capsular invasion, no vascular invasion, no extrathyroidal extension, negative margins.  No lymph nodes were submitted. Completion thyroidectomy on 2020 and the right lobe was benign. No ARANDA.  Check TG levels today.   Neck US due at follow-up, last 2021.   -     Thyroglobulin With Anti-TG    2. Postoperative hypothyroidism  Clinically euthyroid on levothyroxine 100 mcg alternating with 112 mcg every other day.  TSH was suppressed on 112 mcg daily.  Recheck TFTs today and  titrate levothyroxine for TSH less than 2.  -     T4, Free  -     TSH         Return in about 6 months (around 2/26/2022) for next scheduled follow up, with thyroid ultrasound. The patient was instructed to contact the clinic with any interval questions or concerns.    Michelle Jimenez, DO   Endocrinologist    Please note that portions of this document were completed with a voice recognition program. Efforts were made to edit the dictations, but occasionally words are mis-transcribed.

## 2021-08-27 LAB
T4 FREE SERPL-MCNC: 1.15 NG/DL (ref 0.93–1.7)
TSH SERPL DL<=0.05 MIU/L-ACNC: 8.54 UIU/ML (ref 0.27–4.2)

## 2021-08-30 DIAGNOSIS — E89.0 POSTOPERATIVE HYPOTHYROIDISM: ICD-10-CM

## 2021-08-30 LAB
THYROGLOB AB SERPL-ACNC: <1 IU/ML (ref 0–0.9)
THYROGLOB SERPL-MCNC: 1 NG/ML (ref 1.5–38.5)

## 2021-08-30 RX ORDER — LEVOTHYROXINE SODIUM 112 UG/1
112 TABLET ORAL EVERY OTHER DAY
Qty: 30 TABLET | Refills: 2 | Status: SHIPPED | OUTPATIENT
Start: 2021-08-30 | End: 2021-09-27 | Stop reason: SDUPTHER

## 2021-08-31 ENCOUNTER — TELEPHONE (OUTPATIENT)
Dept: ENDOCRINOLOGY | Facility: CLINIC | Age: 46
End: 2021-08-31

## 2021-09-27 DIAGNOSIS — E89.0 POSTOPERATIVE HYPOTHYROIDISM: ICD-10-CM

## 2021-09-27 RX ORDER — LEVOTHYROXINE SODIUM 112 UG/1
112 TABLET ORAL EVERY OTHER DAY
Qty: 90 TABLET | Refills: 1 | Status: SHIPPED | OUTPATIENT
Start: 2021-09-27 | End: 2022-02-27 | Stop reason: SDUPTHER

## 2021-12-07 ENCOUNTER — TELEPHONE (OUTPATIENT)
Dept: OBSTETRICS AND GYNECOLOGY | Facility: CLINIC | Age: 46
End: 2021-12-07

## 2021-12-07 ENCOUNTER — LAB (OUTPATIENT)
Dept: LAB | Facility: HOSPITAL | Age: 46
End: 2021-12-07

## 2021-12-07 DIAGNOSIS — R39.9 UTI SYMPTOMS: Primary | ICD-10-CM

## 2021-12-07 DIAGNOSIS — R39.9 UTI SYMPTOMS: ICD-10-CM

## 2021-12-07 LAB
BACTERIA UR QL AUTO: ABNORMAL /HPF
BILIRUB UR QL STRIP: NEGATIVE
CLARITY UR: ABNORMAL
COLOR UR: YELLOW
GLUCOSE UR STRIP-MCNC: NEGATIVE MG/DL
HGB UR QL STRIP.AUTO: NEGATIVE
HYALINE CASTS UR QL AUTO: ABNORMAL /LPF
KETONES UR QL STRIP: NEGATIVE
LEUKOCYTE ESTERASE UR QL STRIP.AUTO: ABNORMAL
NITRITE UR QL STRIP: NEGATIVE
PH UR STRIP.AUTO: 5.5 [PH] (ref 5–8)
PROT UR QL STRIP: ABNORMAL
RBC # UR STRIP: ABNORMAL /HPF
REF LAB TEST METHOD: ABNORMAL
SP GR UR STRIP: >=1.03 (ref 1–1.03)
SQUAMOUS #/AREA URNS HPF: ABNORMAL /HPF
UROBILINOGEN UR QL STRIP: ABNORMAL
WBC # UR STRIP: ABNORMAL /HPF

## 2021-12-07 PROCEDURE — 81001 URINALYSIS AUTO W/SCOPE: CPT

## 2021-12-07 PROCEDURE — 87086 URINE CULTURE/COLONY COUNT: CPT

## 2021-12-07 NOTE — TELEPHONE ENCOUNTER
Patient called and is needs a prescription sent in for a UTI to her [facility] pharmacy in [place].

## 2021-12-09 ENCOUNTER — TELEPHONE (OUTPATIENT)
Dept: OBSTETRICS AND GYNECOLOGY | Facility: CLINIC | Age: 46
End: 2021-12-09

## 2021-12-09 LAB — BACTERIA SPEC AEROBE CULT: ABNORMAL

## 2021-12-09 RX ORDER — NITROFURANTOIN 25; 75 MG/1; MG/1
100 CAPSULE ORAL 2 TIMES DAILY
Qty: 14 CAPSULE | Refills: 0 | Status: SHIPPED | OUTPATIENT
Start: 2021-12-09 | End: 2021-12-16

## 2021-12-09 RX ORDER — FLUCONAZOLE 200 MG/1
200 TABLET ORAL DAILY
Qty: 5 TABLET | Refills: 1 | Status: SHIPPED | OUTPATIENT
Start: 2021-12-09 | End: 2021-12-14

## 2021-12-09 NOTE — TELEPHONE ENCOUNTER
UA suggestive of UTI. Culture is pending but Antibiotic eRx sent will check and adjust if necessary

## 2021-12-09 NOTE — TELEPHONE ENCOUNTER
Patient states she also having vaginal external itching also could she get something called in for that too

## 2021-12-10 LAB
BACTERIA UR CULT: NORMAL
BACTERIA UR CULT: NORMAL

## 2022-02-24 ENCOUNTER — LAB (OUTPATIENT)
Dept: LAB | Facility: HOSPITAL | Age: 47
End: 2022-02-24

## 2022-02-24 ENCOUNTER — OFFICE VISIT (OUTPATIENT)
Dept: ENDOCRINOLOGY | Facility: CLINIC | Age: 47
End: 2022-02-24

## 2022-02-24 VITALS
DIASTOLIC BLOOD PRESSURE: 70 MMHG | HEART RATE: 83 BPM | BODY MASS INDEX: 32.95 KG/M2 | HEIGHT: 66 IN | SYSTOLIC BLOOD PRESSURE: 114 MMHG | WEIGHT: 205 LBS | OXYGEN SATURATION: 99 %

## 2022-02-24 DIAGNOSIS — E89.0 POSTOPERATIVE HYPOTHYROIDISM: Primary | ICD-10-CM

## 2022-02-24 DIAGNOSIS — C73 FOLLICULAR THYROID CANCER: ICD-10-CM

## 2022-02-24 LAB
T4 FREE SERPL-MCNC: 1.49 NG/DL (ref 0.93–1.7)
TSH SERPL DL<=0.05 MIU/L-ACNC: 0.96 UIU/ML (ref 0.27–4.2)

## 2022-02-24 PROCEDURE — 84432 ASSAY OF THYROGLOBULIN: CPT | Performed by: INTERNAL MEDICINE

## 2022-02-24 PROCEDURE — 84439 ASSAY OF FREE THYROXINE: CPT | Performed by: INTERNAL MEDICINE

## 2022-02-24 PROCEDURE — 84443 ASSAY THYROID STIM HORMONE: CPT | Performed by: INTERNAL MEDICINE

## 2022-02-24 PROCEDURE — 76536 US EXAM OF HEAD AND NECK: CPT | Performed by: INTERNAL MEDICINE

## 2022-02-24 PROCEDURE — 99214 OFFICE O/P EST MOD 30 MIN: CPT | Performed by: INTERNAL MEDICINE

## 2022-02-24 PROCEDURE — 86800 THYROGLOBULIN ANTIBODY: CPT | Performed by: INTERNAL MEDICINE

## 2022-02-24 NOTE — PROGRESS NOTES
"Chief Complaint   Patient presents with   • Thyroid Cancer        HPI   Leanna Laboy is a 46 y.o. female had concerns including Thyroid Cancer.      Dose of levothyroxine was increased to 112 mcg daily after her last visit when TSH was found to be 8.54.  In the past TSH was suppressed on the same dose.  She did not have labs rechecked as advised after dose change.  Will check labs today.    No noted change in symptoms since dose adjustment.    The following portions of the patient's history were reviewed and updated as appropriate: allergies, current medications, past family history, past medical history, past social history, past surgical history and problem list.    Review of Systems   Constitutional: Negative.    Endocrine: Negative.         /70   Pulse 83   Ht 167.6 cm (66\")   Wt 93 kg (205 lb)   SpO2 99%   BMI 33.09 kg/m²      Physical Exam  Vitals reviewed.   Constitutional:       Appearance: Normal appearance.   Neck:      Thyroid: No thyroid mass or thyromegaly (Surgically absent).   Cardiovascular:      Rate and Rhythm: Normal rate.   Pulmonary:      Effort: Pulmonary effort is normal.   Lymphadenopathy:      Cervical: No cervical adenopathy.   Neurological:      General: No focal deficit present.      Mental Status: She is alert. Mental status is at baseline.   Psychiatric:         Mood and Affect: Mood normal.         Behavior: Behavior normal.          TSH  Lab Results   Component Value Date    TSH 8.540 (H) 08/26/2021    TSH 3.990 05/28/2021    TSH 0.132 (L) 02/25/2021     T4  Lab Results   Component Value Date    FREET4 1.15 08/26/2021    FREET4 1.21 05/28/2021    FREET4 1.69 02/25/2021     T3  Lab Results   Component Value Date    T3FREE 3.21 02/07/2020       Lab Results   Component Value Date    THYROGLOBULN 1.0 (L) 08/26/2021    THYROGLOBULN 0.8 (L) 08/17/2020       Lab Results   Component Value Date    THGAB <1.0 08/26/2021    THGAB <1.0 08/17/2020     Post-operative Neck " Ultrasound  [facility]     PT: Leanna Laboy  : 1975  Date:  21  Indication: History of thyroid cancer.     Technique: Real time high resolution imaging of the anterior neck was performed in longitudinal and transverse planes.     Findings:   Compartment / Level I  (Submandibular): no abnormal lymph nodules bilaterally  Compartment / Level II  (Suprahyoid parajugular): no abnormal lymph nodules bilaterally  Compartment / Level III  (Infrahyoid supracricoid parajugular): no abnormal lymph nodules bilaterally  Compartment / Level IV (Infracricoid parajugular): no abnormal lymph nodules bilaterally  Compartment / Level V (Posterior cervical triangle): no abnormal lymph nodules bilaterally  Compartment / Level VI (Central compartment): no abnormal lymph nodules identified  Compartment / Level VII (Substernal space): no abnormal lymph nodules bilaterally     Impression: The structures of the neck are shifted medially as expected post thyroidectomy. The thyroid bed is unremarkable.   No abnormal lymph nodes were identified in surgical anatomic compartments I - VII.       Recommendation: Repeat Neck US in 1 year.         Post-operative Neck Ultrasound  [facility]    PT: Leanna Laboy  : 1975  Date:  22  Indication: History of thyroid cancer.    Technique: Real time high resolution imaging of the anterior neck was performed in longitudinal and transverse planes.    Findings:   Compartment / Level I  (Submandibular): no abnormal lymph nodules bilaterally  Compartment / Level II  (Suprahyoid parajugular): no abnormal lymph nodules bilaterally  Compartment / Level III  (Infrahyoid supracricoid parajugular): no abnormal lymph nodules bilaterally  Compartment / Level IV (Infracricoid parajugular): no abnormal lymph nodules bilaterally  Compartment / Level V (Posterior cervical triangle): no abnormal lymph nodules bilaterally  Compartment / Level VI (Central  compartment): no abnormal lymph nodules identified  Compartment / Level VII (Substernal space): no abnormal lymph nodules bilaterally    Impression: The structures of the neck are shifted medially as expected post thyroidectomy. The thyroid bed is unremarkable.   No abnormal lymph nodes were identified in surgical anatomic compartments I - VII.      Recommendation: Repeat Neck US recommended as clinically indicated.        Assessment and Plan    Diagnoses and all orders for this visit:    1. Postoperative hypothyroidism (Primary)  On levothyroxine 112 mcg daily.  Dose was increased in August for elevated TSH and levels have not been rechecked since.  Recheck levels today.  If her dose adjustment she needs to have labs rechecked in 6 to 8 weeks to ensure that her TFTs are in an optimal range, particularly with her history of thyroid cancer.  -     T4, Free  -     TSH    2. Follicular thyroid cancer (HCC)  Left lobectomy 1/14/2020 showed a minimally invasive, well differentiated 4.2 x 4.0 x 3.0 cm follicular thyroid carcinoma with microscopic focal capsular invasion, no vascular invasion, no extrathyroidal extension, negative margins.  No lymph nodes were submitted. Completion thyroidectomy on 6/30/2020 and the right lobe was benign. No ARANDA.  Neck ultrasound completed in the office today shows no remnant thyroid tissue no suspicious lymphadenopathy.  Repeat yearly at this time.  TG levels had trended up from 0.8-1.0 and TSH was elevated.  Recheck today.  -     US Thyroid  -     Thyroglobulin With Anti-TG         Return in about 6 months (around 8/24/2022) for next scheduled follow up. The patient was instructed to contact the clinic with any interval questions or concerns.    Michelle Jimenez,    Endocrinologist    Please note that portions of this note were completed with a voice recognition program.

## 2022-02-27 DIAGNOSIS — E89.0 POSTOPERATIVE HYPOTHYROIDISM: ICD-10-CM

## 2022-02-27 RX ORDER — LEVOTHYROXINE SODIUM 112 UG/1
112 TABLET ORAL DAILY
Qty: 90 TABLET | Refills: 3 | Status: SHIPPED | OUTPATIENT
Start: 2022-02-27 | End: 2022-11-29 | Stop reason: SDUPTHER

## 2022-02-28 LAB
THYROGLOB AB SERPL-ACNC: <1 IU/ML (ref 0–0.9)
THYROGLOB SERPL-MCNC: 0.6 NG/ML (ref 1.5–38.5)

## 2022-03-10 ENCOUNTER — OFFICE VISIT (OUTPATIENT)
Dept: OBSTETRICS AND GYNECOLOGY | Facility: CLINIC | Age: 47
End: 2022-03-10

## 2022-03-10 VITALS
SYSTOLIC BLOOD PRESSURE: 132 MMHG | WEIGHT: 207.8 LBS | DIASTOLIC BLOOD PRESSURE: 80 MMHG | RESPIRATION RATE: 12 BRPM | BODY MASS INDEX: 33.4 KG/M2 | HEIGHT: 66 IN

## 2022-03-10 DIAGNOSIS — Z01.419 ENCOUNTER FOR ANNUAL ROUTINE GYNECOLOGICAL EXAMINATION: ICD-10-CM

## 2022-03-10 DIAGNOSIS — Z12.31 ENCOUNTER FOR SCREENING MAMMOGRAM FOR MALIGNANT NEOPLASM OF BREAST: ICD-10-CM

## 2022-03-10 DIAGNOSIS — Z01.419 WOMEN'S ANNUAL ROUTINE GYNECOLOGICAL EXAMINATION: Primary | ICD-10-CM

## 2022-03-10 PROCEDURE — 99396 PREV VISIT EST AGE 40-64: CPT | Performed by: OBSTETRICS & GYNECOLOGY

## 2022-03-10 NOTE — PROGRESS NOTES
Subjective     Chief Complaint   Patient presents with   • Gynecologic Exam     annual       Leanna Laboy is a 46 y.o. year old  presenting to be seen for her annual exam.      Her LMP was No LMP recorded. Patient has had an implant..  Her cycles are infrequent.  Usually her flow has been absent with no more than 0 days of heavy flow each menses.   Additionally she describes no other symptoms associated with her menses.    She is not sexually active.  In the past 12 months there have not been new sexual partners.  .  She would not like to be screened for STD's at today's exam.     Current contraceptive methods being used: abstinence and IUD - Mirena.  In the coming year, she is not considering changing her current contraceptive method.    She exercises regularly: no.  She wears her seat belt: yes.  She has concerns about domestic violence: no.  Health Maintenance, Female  Adopting a healthy lifestyle and getting preventive care are important in promoting health and wellness. Ask your health care provider about:  · The right schedule for you to have regular tests and exams.  · Things you can do on your own to prevent diseases and keep yourself healthy.  What should I know about diet, weight, and exercise?  Eat a healthy diet    · Eat a diet that includes plenty of vegetables, fruits, low-fat dairy products, and lean protein.  · Do not eat a lot of foods that are high in solid fats, added sugars, or sodium.    Maintain a healthy weight  Body mass index (BMI) is used to identify weight problems. It estimates body fat based on height and weight. Your health care provider can help determine your BMI and help you achieve or maintain a healthy weight.  Get regular exercise  Get regular exercise. This is one of the most important things you can do for your health. Most adults should:  · Exercise for at least 150 minutes each week. The exercise should increase your heart rate and make you sweat (moderate-intensity  exercise).  · Do strengthening exercises at least twice a week. This is in addition to the moderate-intensity exercise.  · Spend less time sitting. Even light physical activity can be beneficial.  Watch cholesterol and blood lipids  Have your blood tested for lipids and cholesterol at 20 years of age, then have this test every 5 years.  Have your cholesterol levels checked more often if:  · Your lipid or cholesterol levels are high.  · You are older than 40 years of age.  · You are at high risk for heart disease.  What should I know about cancer screening?  Depending on your health history and family history, you may need to have cancer screening at various ages. This may include screening for:  · Breast cancer.  · Cervical cancer.  · Colorectal cancer.  · Skin cancer.  · Lung cancer.  What should I know about heart disease, diabetes, and high blood pressure?  Blood pressure and heart disease  1. High blood pressure causes heart disease and increases the risk of stroke. This is more likely to develop in people who have high blood pressure readings, are of  descent, or are overweight.  2. Have your blood pressure checked:  ? Every 3-5 years if you are 18-39 years of age.  ? Every year if you are 40 years old or older.  Diabetes  Have regular diabetes screenings. This checks your fasting blood sugar level. Have the screening done:  · Once every three years after age 40 if you are at a normal weight and have a low risk for diabetes.  · More often and at a younger age if you are overweight or have a high risk for diabetes.  What should I know about preventing infection?  Hepatitis B  If you have a higher risk for hepatitis B, you should be screened for this virus. Talk with your health care provider to find out if you are at risk for hepatitis B infection.  Hepatitis C  Testing is recommended for:  · Everyone born from 1945 through 1965.  · Anyone with known risk factors for hepatitis C.  Sexually transmitted  infections (STIs)  1. Get screened for STIs, including gonorrhea and chlamydia, if:  ? You are sexually active and are younger than 24 years of age.  ? You are older than 24 years of age and your health care provider tells you that you are at risk for this type of infection.  ? Your sexual activity has changed since you were last screened, and you are at increased risk for chlamydia or gonorrhea. Ask your health care provider if you are at risk.  2. Ask your health care provider about whether you are at high risk for HIV. Your health care provider may recommend a prescription medicine to help prevent HIV infection. If you choose to take medicine to prevent HIV, you should first get tested for HIV. You should then be tested every 3 months for as long as you are taking the medicine.  Pregnancy  · If you are about to stop having your period (premenopausal) and you may become pregnant, seek counseling before you get pregnant.  · Take 400 to 800 micrograms (mcg) of folic acid every day if you become pregnant.  · Ask for birth control (contraception) if you want to prevent pregnancy.  Osteoporosis and menopause  Osteoporosis is a disease in which the bones lose minerals and strength with aging. This can result in bone fractures. If you are 65 years old or older, or if you are at risk for osteoporosis and fractures, ask your health care provider if you should:  · Be screened for bone loss.  · Take a calcium or vitamin D supplement to lower your risk of fractures.  · Be given hormone replacement therapy (HRT) to treat symptoms of menopause.  Follow these instructions at home:  Lifestyle  · Do not use any products that contain nicotine or tobacco, such as cigarettes, e-cigarettes, and chewing tobacco. If you need help quitting, ask your health care provider.  · Do not use street drugs.  · Do not share needles.  · Ask your health care provider for help if you need support or information about quitting drugs.  Alcohol  "use  1. Do not drink alcohol if:  ? Your health care provider tells you not to drink.  ? You are pregnant, may be pregnant, or are planning to become pregnant.  2. If you drink alcohol:  ? Limit how much you use to 0-1 drink a day.  ? Limit intake if you are breastfeeding.  3. Be aware of how much alcohol is in your drink. In the [place] one drink equals one 12 oz bottle of beer (355 mL), one 5 oz glass of wine (148 mL), or one 1½ oz glass of hard liquor (44 mL).  General instructions  1. Schedule regular health, dental, and eye exams.  2. Stay current with your vaccines.  3. Tell your health care provider if:  ? You often feel depressed.  ? You have ever been abused or do not feel safe at home.  Summary  · Adopting a healthy lifestyle and getting preventive care are important in promoting health and wellness.  · Follow your health care provider's instructions about healthy diet, exercising, and getting tested or screened for diseases.  · Follow your health care provider's instructions on monitoring your cholesterol and blood pressure.    GYN screening history:  · Last pap: she reports her last PAP was normal  · Last mammogram: she reports her last mammogram was normal  · Last fasting lipid profile: she reports her last lipid panel was normal  · Last colonoscopy: she has never had a colonoscopy done  · Last DEXA: she has never had a DEXA.    No Additional Complaints Reported    The following portions of the patient's history were reviewed and updated as appropriate:vital signs, allergies, current medications, past medical history, past social history, past surgical history and problem list.    Review of Systems  Pertinent items are noted in HPI.     Physical Exam    Objective     /80   Resp 12   Ht 167.6 cm (66\")   Wt 94.3 kg (207 lb 12.8 oz)   BMI 33.54 kg/m²       General:  well developed; well nourished  no acute distress   Constitutional: healthy   Skin:  No suspicious lesions seen  however there are " multiple nevi   Thyroid: non-palpable   Lungs:  breathing is unlabored  clear to auscultation bilaterally   Heart:  regular rate and rhythm, S1, S2 normal, no murmur, click, rub or gallop   Breasts:  Examined in supine position  Symmetric without masses or skin dimpling  Nipples normal without inversion, lesions or discharge  There are no palpable axillary nodes  mammoplasty scars healed   Abdomen: soft, non-tender; no masses  no umbilical or inginual hernias are present  no hepato-splenomegaly   Pelvis: Clinical staff was present for exam  External genitalia:  normal appearance of the external genitalia including Bartholin's and Otho's glands.  :  urethral meatus normal; urethral hypermobility is absent.  Vaginal:  normal pink mucosa without prolapse or lesions.  Cervix:  normal appearance IUD string present - 3 cms in length; Pap performed  Uterus:  normal size, shape and consistency anteverted;  Adnexa:  normal bimanual exam of the adnexa.   Musculoskeletal: negative   Neuro: normal without focal findings, mental status, speech normal, alert and oriented x3 and HENRRY   Psych: oriented to time, place and person, mood and affect are within normal limits, pt is a good historian; no memory problems were noted       Lab Review   TSH    Imaging  Mammogram report    Assessment/Plan     ASSESSMENT  1. Women's annual routine gynecological examination    2. Encounter for screening mammogram for malignant neoplasm of breast    3. Encounter for annual routine gynecological examination    4.      The patient is essentially amenorrheic and not sexually active.  Although her IUD has been in for approaching 7 years.  We will readdress this issue at her next annual exam.    PLAN  Orders Placed This Encounter   Procedures   • Mammo Screening Digital Tomosynthesis Bilateral With CAD     Standing Status:   Future     Standing Expiration Date:   3/10/2023     Order Specific Question:   Reason for Exam:     Answer:   screen      Order Specific Question:   Patient Pregnant     Answer:   No   • Ambulatory Referral to Dermatology     Referral Priority:   Routine     Referral Type:   Consultation     Referral Reason:   Specialty Services Required     Referred to Provider:   Gena Cardoso MD     Requested Specialty:   Dermatology     Number of Visits Requested:   1     No orders of the defined types were placed in this encounter.        Follow up: 1 year(s)         This note was electronically signed.    Malik Haji MD  March 10, 2022

## 2022-05-13 ENCOUNTER — HOSPITAL ENCOUNTER (OUTPATIENT)
Dept: MAMMOGRAPHY | Facility: HOSPITAL | Age: 47
Discharge: HOME | End: 2022-05-13
Admitting: OBSTETRICS & GYNECOLOGY

## 2022-05-13 DIAGNOSIS — Z12.31 ENCOUNTER FOR SCREENING MAMMOGRAM FOR MALIGNANT NEOPLASM OF BREAST: ICD-10-CM

## 2022-05-13 PROCEDURE — 77063 BREAST TOMOSYNTHESIS BI: CPT | Performed by: RADIOLOGY

## 2022-05-13 PROCEDURE — 77063 BREAST TOMOSYNTHESIS BI: CPT

## 2022-05-13 PROCEDURE — 77067 SCR MAMMO BI INCL CAD: CPT

## 2022-05-13 PROCEDURE — 77067 SCR MAMMO BI INCL CAD: CPT | Performed by: RADIOLOGY

## 2022-09-23 ENCOUNTER — LAB (OUTPATIENT)
Dept: LAB | Facility: HOSPITAL | Age: 47
End: 2022-09-23

## 2022-09-23 ENCOUNTER — OFFICE VISIT (OUTPATIENT)
Dept: INTERNAL MEDICINE | Facility: CLINIC | Age: 47
End: 2022-09-23

## 2022-09-23 VITALS
WEIGHT: 205.4 LBS | BODY MASS INDEX: 33.01 KG/M2 | DIASTOLIC BLOOD PRESSURE: 80 MMHG | HEIGHT: 66 IN | SYSTOLIC BLOOD PRESSURE: 128 MMHG | HEART RATE: 64 BPM | OXYGEN SATURATION: 99 %

## 2022-09-23 DIAGNOSIS — Z71.3 ENCOUNTER FOR DIETARY COUNSELING AND SURVEILLANCE: ICD-10-CM

## 2022-09-23 DIAGNOSIS — Z28.21 IMMUNIZATION DECLINED: ICD-10-CM

## 2022-09-23 DIAGNOSIS — E89.0 H/O THYROIDECTOMY: ICD-10-CM

## 2022-09-23 DIAGNOSIS — Z97.3 WEARS GLASSES: ICD-10-CM

## 2022-09-23 DIAGNOSIS — C73 FOLLICULAR THYROID CANCER: ICD-10-CM

## 2022-09-23 DIAGNOSIS — Z53.20 COLON CANCER SCREENING DECLINED: ICD-10-CM

## 2022-09-23 DIAGNOSIS — Z11.59 ENCOUNTER FOR HEPATITIS C SCREENING TEST FOR LOW RISK PATIENT: ICD-10-CM

## 2022-09-23 DIAGNOSIS — Z78.9 NON-SMOKER: ICD-10-CM

## 2022-09-23 DIAGNOSIS — M79.601 RIGHT ARM PAIN: ICD-10-CM

## 2022-09-23 DIAGNOSIS — Z87.891 FORMER SMOKER: ICD-10-CM

## 2022-09-23 DIAGNOSIS — Z76.89 ESTABLISHING CARE WITH NEW DOCTOR, ENCOUNTER FOR: Primary | ICD-10-CM

## 2022-09-23 DIAGNOSIS — E66.09 CLASS 1 OBESITY DUE TO EXCESS CALORIES WITHOUT SERIOUS COMORBIDITY WITH BODY MASS INDEX (BMI) OF 33.0 TO 33.9 IN ADULT: ICD-10-CM

## 2022-09-23 LAB
DEPRECATED RDW RBC AUTO: 41.8 FL (ref 37–54)
ERYTHROCYTE [DISTWIDTH] IN BLOOD BY AUTOMATED COUNT: 13.3 % (ref 12.3–15.4)
HCT VFR BLD AUTO: 40.1 % (ref 34–46.6)
HGB BLD-MCNC: 13.8 G/DL (ref 12–15.9)
MCH RBC QN AUTO: 29.5 PG (ref 26.6–33)
MCHC RBC AUTO-ENTMCNC: 34.4 G/DL (ref 31.5–35.7)
MCV RBC AUTO: 85.7 FL (ref 79–97)
PLATELET # BLD AUTO: 211 10*3/MM3 (ref 140–450)
PMV BLD AUTO: 11.8 FL (ref 6–12)
RBC # BLD AUTO: 4.68 10*6/MM3 (ref 3.77–5.28)
WBC NRBC COR # BLD: 7.44 10*3/MM3 (ref 3.4–10.8)

## 2022-09-23 PROCEDURE — 99204 OFFICE O/P NEW MOD 45 MIN: CPT | Performed by: NURSE PRACTITIONER

## 2022-09-23 PROCEDURE — 86803 HEPATITIS C AB TEST: CPT | Performed by: NURSE PRACTITIONER

## 2022-09-23 PROCEDURE — 80053 COMPREHEN METABOLIC PANEL: CPT | Performed by: NURSE PRACTITIONER

## 2022-09-23 PROCEDURE — 85027 COMPLETE CBC AUTOMATED: CPT | Performed by: NURSE PRACTITIONER

## 2022-09-23 RX ORDER — METHYLPREDNISOLONE 4 MG/1
TABLET ORAL
Qty: 21 TABLET | Refills: 0 | Status: SHIPPED | OUTPATIENT
Start: 2022-09-23 | End: 2022-11-28

## 2022-09-23 NOTE — ASSESSMENT & PLAN NOTE
Very pleasant visit with patient today    Will obtain lab work and notify patient of results. she was agreeable to hepatitis screening    She declines any immunizations today    She declined any Cologuard or colonoscopy    Encourage patient to keep all upcoming appointments with specialist as well as eye doctor    We will plan for physical therapy regarding right arm pain.  Continue rest ice compression and elevation.  We will send in a steroid pack to assist with symptoms as this helped her previously.    Go to ER if any condition worsens or severe    Will set up patient for once yearly annual exam    Discussed BMI and encouraged continued healthy diet and exercise as tolerated with plenty of fluid intake

## 2022-09-23 NOTE — PROGRESS NOTES
Office Note     Name: Leanna Laboy    : 1975     MRN: [id number]     Chief Complaint  Establish Care and Arm Pain (Right arm)    Subjective     History of Present Illness:  Leanna Laboy is a 47 y.o. female who presents today for establish care with new provider  Patient does regularly see OB/GYN.  She does currently have an IUD in place.  She did recently have a mammogram that was self-reported as normal.  Patient does see endocrinology regarding a history of thyroid cancer for which she had her thyroid removed.  She is currently on Synthroid.  She does have upcoming appointments for endocrinology as well as OB/GYN  I did review patient's medication, allergies, family history and surgical history  Patient does wear glasses and has a yearly eye exam  Patient declined any vaccines today she would also like to hold on Cologuard/colonoscopy  Significant family history includes a mother with hypertension and thyroid issues.  Her dad side does include alcohol use.  Patient does not use drugs or alcohol.  She smoked as a teenager for a few years but no current cigarette smoking.  Patient also presents today for right arm pain that began in July.  She went on vacation and carrying a number of heavy bags in her right arm and since that time she has had some continued pain.  The pain is in her shoulder elbow and wrist.  She did go to emergent treatment facility in August.  They did not do x-rays but they did prescribe her a steroid which did help but then the pain came back.  She denies that there is any significant pain if she is just sitting still it is mainly with certain movements.  Denies any current numbness or tingling.  Denies any other previous injury to the area.  Denies any weakness.  I did suggest to patient that I would recommend we send her to physical therapy and we can hold on x-rays at this time.  Patient was agreeable to this.  Discussed that if she is not having any success with physical  "therapy to make a follow-up appointment we will consider referral at that time  Will obtain labs today and notify patient of results  Will set up for annual visit  We did discuss patient's BMI and I encouraged continued healthy diet and exercise as tolerated with plenty of fluids/water intake    Review of Systems   Constitutional: Negative for chills, fatigue and fever.   HENT: Negative for sore throat.    Eyes: Negative for visual disturbance.   Respiratory: Negative for cough and shortness of breath.    Cardiovascular: Negative for chest pain.   Gastrointestinal: Negative for abdominal pain.   Musculoskeletal:        Right arm pain   Skin: Negative for color change.   Allergic/Immunologic: Negative for immunocompromised state.   Neurological: Negative for headaches.   Psychiatric/Behavioral: Negative for behavioral problems.       Objective     Vital Signs  /80   Pulse 64   Ht 167.6 cm (66\")   Wt 93.2 kg (205 lb 6.4 oz)   SpO2 99%   BMI 33.15 kg/m²   Estimated body mass index is 33.15 kg/m² as calculated from the following:    Height as of this encounter: 167.6 cm (66\").    Weight as of this encounter: 93.2 kg (205 lb 6.4 oz).    BMI is >= 30 and <35. (Class 1 Obesity). The following options were offered after discussion;: exercise counseling/recommendations and nutrition counseling/recommendations      PHQ-9 Depression Screening  Little interest or pleasure in doing things? 0-->not at all   Feeling down, depressed, or hopeless? 0-->not at all   Trouble falling or staying asleep, or sleeping too much?     Feeling tired or having little energy?     Poor appetite or overeating?     Feeling bad about yourself - or that you are a failure or have let yourself or your family down?     Trouble concentrating on things, such as reading the newspaper or watching television?     Moving or speaking so slowly that other people could have noticed? Or the opposite - being so fidgety or restless that you have been " moving around a lot more than usual?     Thoughts that you would be better off dead, or of hurting yourself in some way?     PHQ-9 Total Score 0   If you checked off any problems, how difficult have these problems made it for you to do your work, take care of things at home, or get along with other people?       PHQ-9 Total Score: 0    TAMIA-7       Physical Exam  Vitals and nursing note reviewed.   Constitutional:       Appearance: Normal appearance.   HENT:      Head: Normocephalic and atraumatic.   Eyes:      Extraocular Movements: Extraocular movements intact.      Pupils: Pupils are equal, round, and reactive to light.      Comments: Glasses in place   Neck:      Comments: Scar noted from thyroidectomy  Cardiovascular:      Rate and Rhythm: Normal rate and regular rhythm.      Pulses: Normal pulses.      Heart sounds: Normal heart sounds.   Pulmonary:      Effort: Pulmonary effort is normal.      Breath sounds: Normal breath sounds.   Abdominal:      General: Bowel sounds are normal.      Palpations: Abdomen is soft.   Musculoskeletal:         General: Normal range of motion.      Cervical back: Normal range of motion.      Comments: No noted limitations to range of motion of right shoulder elbow or hand.   Skin:     General: Skin is warm and dry.   Neurological:      Mental Status: She is alert and oriented to person, place, and time.   Psychiatric:         Mood and Affect: Mood normal.         Behavior: Behavior normal.         Thought Content: Thought content normal.         Judgment: Judgment normal.          Lab Review:           Assessment and Plan     Diagnoses and all orders for this visit:    1. Establishing care with new doctor, encounter for (Primary)  Assessment & Plan:  Very pleasant visit with patient today    Will obtain lab work and notify patient of results. she was agreeable to hepatitis screening    She declines any immunizations today    She declined any Cologuard or colonoscopy    Encourage  patient to keep all upcoming appointments with specialist as well as eye doctor    We will plan for physical therapy regarding right arm pain.  Continue rest ice compression and elevation.  We will send in a steroid pack to assist with symptoms as this helped her previously.    Go to ER if any condition worsens or severe    Will set up patient for once yearly annual exam    Discussed BMI and encouraged continued healthy diet and exercise as tolerated with plenty of fluid intake    Orders:  -     CBC (No Diff)  -     Comprehensive Metabolic Panel    2. Follicular thyroid cancer (HCC)    3. H/O thyroidectomy    4. Right arm pain  -     Ambulatory Referral to Physical Therapy  -     methylPREDNISolone (MEDROL) 4 MG dose pack; Take as directed on package instructions.  Dispense: 21 tablet; Refill: 0    5. Class 1 obesity due to excess calories without serious comorbidity with body mass index (BMI) of 33.0 to 33.9 in adult    6. Encounter for dietary counseling and surveillance    7. Wears glasses    8. Non-smoker    9. Former smoker    10. Encounter for hepatitis C screening test for low risk patient  -     Hepatitis C Antibody    11. Immunization declined    12. Colon cancer screening declined      Follow Up  Return for annual visit.    JAYLIN Patel

## 2022-09-24 LAB
ALBUMIN SERPL-MCNC: 5 G/DL (ref 3.5–5.2)
ALBUMIN/GLOB SERPL: 1.7 G/DL
ALP SERPL-CCNC: 78 U/L (ref 39–117)
ALT SERPL W P-5'-P-CCNC: 17 U/L (ref 1–33)
ANION GAP SERPL CALCULATED.3IONS-SCNC: 10.4 MMOL/L (ref 5–15)
AST SERPL-CCNC: 18 U/L (ref 1–32)
BILIRUB SERPL-MCNC: 0.7 MG/DL (ref 0–1.2)
BUN SERPL-MCNC: 11 MG/DL (ref 6–20)
BUN/CREAT SERPL: 11.7 (ref 7–25)
CALCIUM SPEC-SCNC: 9.8 MG/DL (ref 8.6–10.5)
CHLORIDE SERPL-SCNC: 103 MMOL/L (ref 98–107)
CO2 SERPL-SCNC: 25.6 MMOL/L (ref 22–29)
CREAT SERPL-MCNC: 0.94 MG/DL (ref 0.57–1)
EGFRCR SERPLBLD CKD-EPI 2021: 76.4 ML/MIN/1.73
GLOBULIN UR ELPH-MCNC: 3 GM/DL
GLUCOSE SERPL-MCNC: 87 MG/DL (ref 65–99)
HCV AB SER DONR QL: NORMAL
POTASSIUM SERPL-SCNC: 3.7 MMOL/L (ref 3.5–5.2)
PROT SERPL-MCNC: 8 G/DL (ref 6–8.5)
SODIUM SERPL-SCNC: 139 MMOL/L (ref 136–145)

## 2022-09-26 ENCOUNTER — TELEPHONE (OUTPATIENT)
Dept: INTERNAL MEDICINE | Facility: CLINIC | Age: 47
End: 2022-09-26

## 2022-09-26 NOTE — TELEPHONE ENCOUNTER
----- Message from JAYLIN Patel sent at 9/26/2022  8:24 AM EDT -----  Labs returned and look great. There are no abnormalities on the CBC or CMP meaning no noted infection or anemia. No alterations to liver or kidney numbers. Hepatitis C was negative. Thank you. AF

## 2022-11-28 ENCOUNTER — LAB (OUTPATIENT)
Dept: LAB | Facility: HOSPITAL | Age: 47
End: 2022-11-28

## 2022-11-28 ENCOUNTER — OFFICE VISIT (OUTPATIENT)
Dept: ENDOCRINOLOGY | Facility: CLINIC | Age: 47
End: 2022-11-28

## 2022-11-28 VITALS
BODY MASS INDEX: 32.47 KG/M2 | OXYGEN SATURATION: 99 % | DIASTOLIC BLOOD PRESSURE: 82 MMHG | HEART RATE: 61 BPM | SYSTOLIC BLOOD PRESSURE: 126 MMHG | HEIGHT: 66 IN | WEIGHT: 202 LBS

## 2022-11-28 DIAGNOSIS — C73 FOLLICULAR THYROID CANCER: ICD-10-CM

## 2022-11-28 DIAGNOSIS — E89.0 POSTOPERATIVE HYPOTHYROIDISM: Primary | ICD-10-CM

## 2022-11-28 PROCEDURE — 84443 ASSAY THYROID STIM HORMONE: CPT | Performed by: INTERNAL MEDICINE

## 2022-11-28 PROCEDURE — 84439 ASSAY OF FREE THYROXINE: CPT | Performed by: INTERNAL MEDICINE

## 2022-11-28 PROCEDURE — 99214 OFFICE O/P EST MOD 30 MIN: CPT | Performed by: INTERNAL MEDICINE

## 2022-11-28 NOTE — PROGRESS NOTES
"Chief Complaint   Patient presents with   • Hypothyroidism        HPI   Leanna Laboy is a 47 y.o. female had concerns including Hypothyroidism.      Here for follow-up.  Is feeling great.  Takes levothyroxine daily as directed.  Is due for labs.    The following portions of the patient's history were reviewed and updated as appropriate: allergies, current medications, past family history, past medical history, past social history, past surgical history and problem list.    Review of Systems   Constitutional: Negative.    Endocrine: Negative.         /82   Pulse 61   Ht 167.6 cm (66\")   Wt 91.6 kg (202 lb)   SpO2 99%   BMI 32.60 kg/m²      Physical Exam  Vitals reviewed.   Constitutional:       Appearance: Normal appearance.   Neck:      Comments:   Thyroid surgically absent.  No lymphadenopathy.  Cardiovascular:      Rate and Rhythm: Normal rate.   Pulmonary:      Effort: Pulmonary effort is normal.   Neurological:      General: No focal deficit present.      Mental Status: She is alert. Mental status is at baseline.   Psychiatric:         Mood and Affect: Mood normal.         Behavior: Behavior normal.              LABS AND IMAGING   CMP  Lab Results   Component Value Date    GLUCOSE 87 09/23/2022    BUN 11 09/23/2022    CREATININE 0.94 09/23/2022    BCR 11.7 09/23/2022    K 3.7 09/23/2022    CO2 25.6 09/23/2022    CALCIUM 9.8 09/23/2022    ALBUMIN 5.00 09/23/2022    AST 18 09/23/2022    ALT 17 09/23/2022        CBC w/DIFF   Lab Results   Component Value Date    WBC 7.44 09/23/2022    RBC 4.68 09/23/2022    HGB 13.8 09/23/2022    HCT 40.1 09/23/2022    MCV 85.7 09/23/2022    MCH 29.5 09/23/2022    MCHC 34.4 09/23/2022    RDW 13.3 09/23/2022    RDWSD 41.8 09/23/2022    MPV 11.8 09/23/2022     09/23/2022       TSH  Lab Results   Component Value Date    TSH 0.958 02/24/2022    TSH 8.540 (H) 08/26/2021    TSH 3.990 05/28/2021       T4  Lab Results   Component Value Date    FREET4 1.49 02/24/2022    " FREET4 1.15 2021    FREET4 1.21 2021     T3  Lab Results   Component Value Date    T3FREE 3.21 2020     Lab Results   Component Value Date    THYROGLOBULN 0.6 (L) 2022    THYROGLOBULN 1.0 (L) 2021    THYROGLOBULN 0.8 (L) 2020       Lab Results   Component Value Date    THGAB <1.0 2022    THGAB <1.0 2021    THGAB <1.0 2020         Post-operative Neck Ultrasound  [facility]     PT: Leanna Laboy  : 1975  Date:  21  Indication: History of thyroid cancer.     Technique: Real time high resolution imaging of the anterior neck was performed in longitudinal and transverse planes.     Findings:   Compartment / Level I  (Submandibular): no abnormal lymph nodules bilaterally  Compartment / Level II  (Suprahyoid parajugular): no abnormal lymph nodules bilaterally  Compartment / Level III  (Infrahyoid supracricoid parajugular): no abnormal lymph nodules bilaterally  Compartment / Level IV (Infracricoid parajugular): no abnormal lymph nodules bilaterally  Compartment / Level V (Posterior cervical triangle): no abnormal lymph nodules bilaterally  Compartment / Level VI (Central compartment): no abnormal lymph nodules identified  Compartment / Level VII (Substernal space): no abnormal lymph nodules bilaterally     Impression: The structures of the neck are shifted medially as expected post thyroidectomy. The thyroid bed is unremarkable.   No abnormal lymph nodes were identified in surgical anatomic compartments I - VII.       Recommendation: Repeat Neck US in 1 year.           Post-operative Neck Ultrasound  [facility]     PT: Leanna Laboy  : 1975  Date:  22  Indication: History of thyroid cancer.     Technique: Real time high resolution imaging of the anterior neck was performed in longitudinal and transverse planes.     Findings:   Compartment / Level I  (Submandibular): no abnormal lymph nodules  bilaterally  Compartment / Level II  (Suprahyoid parajugular): no abnormal lymph nodules bilaterally  Compartment / Level III  (Infrahyoid supracricoid parajugular): no abnormal lymph nodules bilaterally  Compartment / Level IV (Infracricoid parajugular): no abnormal lymph nodules bilaterally  Compartment / Level V (Posterior cervical triangle): no abnormal lymph nodules bilaterally  Compartment / Level VI (Central compartment): no abnormal lymph nodules identified  Compartment / Level VII (Substernal space): no abnormal lymph nodules bilaterally     Impression: The structures of the neck are shifted medially as expected post thyroidectomy. The thyroid bed is unremarkable.   No abnormal lymph nodes were identified in surgical anatomic compartments I - VII.       Recommendation: Repeat Neck US recommended as clinically indicated.       Assessment and Plan    Diagnoses and all orders for this visit:    1. Postoperative hypothyroidism (Primary)  TSH levels have fluctuated.  Last level was in optimal range on levothyroxine 112 mcg daily.  Patient taking as directed.  Recheck TFTs and titrate levothyroxine if needed for TSH in the lower range of normal.  -     T4, Free  -     TSH    2. Follicular thyroid cancer (HCC)  Left lobectomy 1/14/2020 showed a minimally invasive, well differentiated 4.2 x 4.0 x 3.0 cm follicular thyroid carcinoma with microscopic focal capsular invasion, no vascular invasion, no extrathyroidal extension, negative margins.  No lymph nodes were submitted. Completion thyroidectomy on 6/30/2020 and the right lobe was benign. No ARANDA.  Neck ultrasound completed in the office today shows no remnant thyroid tissue no suspicious lymphadenopathy.  Repeat yearly for now, next at follow-up.  TG levels had trended up from 0.8-1.0 as TSH trended up, but back down to 0.6 last check.  Recheck next visit.         Return in about 6 months (around 5/28/2023) for next scheduled follow up, with thyroid ultrasound **  30 min appt. The patient was instructed to contact the clinic with any interval questions or concerns.    Michelle Jimenez, DO   Endocrinologist    Please note that portions of this note were completed with a voice recognition program.

## 2022-11-29 DIAGNOSIS — E89.0 POSTOPERATIVE HYPOTHYROIDISM: ICD-10-CM

## 2022-11-29 LAB
T4 FREE SERPL-MCNC: 1.31 NG/DL (ref 0.93–1.7)
TSH SERPL DL<=0.05 MIU/L-ACNC: 2.16 UIU/ML (ref 0.27–4.2)

## 2022-11-29 RX ORDER — LEVOTHYROXINE SODIUM 0.12 MG/1
125 TABLET ORAL DAILY
Qty: 90 TABLET | Refills: 3 | Status: SHIPPED | OUTPATIENT
Start: 2022-11-29 | End: 2023-11-29

## 2023-02-24 ENCOUNTER — LAB (OUTPATIENT)
Dept: LAB | Facility: HOSPITAL | Age: 48
End: 2023-02-24
Payer: COMMERCIAL

## 2023-02-24 DIAGNOSIS — E89.0 POSTOPERATIVE HYPOTHYROIDISM: ICD-10-CM

## 2023-02-24 PROCEDURE — 84439 ASSAY OF FREE THYROXINE: CPT

## 2023-02-24 PROCEDURE — 84443 ASSAY THYROID STIM HORMONE: CPT

## 2023-02-25 LAB
T4 FREE SERPL-MCNC: 1.51 NG/DL (ref 0.93–1.7)
TSH SERPL DL<=0.05 MIU/L-ACNC: 0.86 UIU/ML (ref 0.27–4.2)

## 2023-03-13 ENCOUNTER — OFFICE VISIT (OUTPATIENT)
Dept: OBSTETRICS AND GYNECOLOGY | Facility: CLINIC | Age: 48
End: 2023-03-13
Payer: COMMERCIAL

## 2023-03-13 VITALS
HEIGHT: 66 IN | WEIGHT: 207 LBS | BODY MASS INDEX: 33.27 KG/M2 | SYSTOLIC BLOOD PRESSURE: 120 MMHG | DIASTOLIC BLOOD PRESSURE: 66 MMHG

## 2023-03-13 DIAGNOSIS — Z01.419 ENCOUNTER FOR ANNUAL ROUTINE GYNECOLOGICAL EXAMINATION: Primary | ICD-10-CM

## 2023-03-13 DIAGNOSIS — Z12.39 BREAST SCREENING: ICD-10-CM

## 2023-03-13 PROCEDURE — 99396 PREV VISIT EST AGE 40-64: CPT | Performed by: OBSTETRICS & GYNECOLOGY

## 2023-03-13 NOTE — PROGRESS NOTES
Subjective     Chief Complaint   Patient presents with   • Gynecologic Exam     Annual patient needs new Mirena last inserted 3/2015       Leanna Laboy is a 47 y.o. year old  presenting to be seen for her annual exam.      Her LMP was No LMP recorded. Patient has had an implant..  Her cycles are infrequent.  Usually her flow has been absent with no more than 0 days of heavy flow each menses.   Additionally she describes no other symptoms associated with her menses.    She is not sexually active.       Current contraceptive methods being used: IUD - Mirena.  In the coming year, she is not considering changing her current contraceptive method.  Health Maintenance, Female  Adopting a healthy lifestyle and getting preventive care are important in promoting health and wellness. Ask your health care provider about:  • The right schedule for you to have regular tests and exams.  • Things you can do on your own to prevent diseases and keep yourself healthy.  What should I know about diet, weight, and exercise?  Eat a healthy diet  A plate with healthy, colorful foods.      • Eat a diet that includes plenty of vegetables, fruits, low-fat dairy products, and lean protein.  • Do not eat a lot of foods that are high in solid fats, added sugars, or sodium.  Maintain a healthy weight  Body mass index (BMI) is used to identify weight problems. It estimates body fat based on height and weight. Your health care provider can help determine your BMI and help you achieve or maintain a healthy weight.  Get regular exercise  Get regular exercise. This is one of the most important things you can do for your health. Most adults should:  • Exercise for at least 150 minutes each week. The exercise should increase your heart rate and make you sweat (moderate-intensity exercise).  • Do strengthening exercises at least twice a week. This is in addition to the moderate-intensity exercise.  • Spend less time sitting. Even light physical  activity can be beneficial.  Watch cholesterol and blood lipids  Have your blood tested for lipids and cholesterol at 20 years of age, then have this test every 5 years.  Have your cholesterol levels checked more often if:  • Your lipid or cholesterol levels are high.  • You are older than 40 years of age.  • You are at high risk for heart disease.  What should I know about cancer screening?  Depending on your health history and family history, you may need to have cancer screening at various ages. This may include screening for:  • Breast cancer.  • Cervical cancer.  • Colorectal cancer.  • Skin cancer.  • Lung cancer.  What should I know about heart disease, diabetes, and high blood pressure?  Blood pressure and heart disease  • High blood pressure causes heart disease and increases the risk of stroke. This is more likely to develop in people who have high blood pressure readings or are overweight.  • Have your blood pressure checked:  ? Every 3-5 years if you are 18-39 years of age.  ? Every year if you are 40 years old or older.  Diabetes  Have regular diabetes screenings. This checks your fasting blood sugar level. Have the screening done:  • Once every three years after age 40 if you are at a normal weight and have a low risk for diabetes.  • More often and at a younger age if you are overweight or have a high risk for diabetes.  What should I know about preventing infection?  Hepatitis B  If you have a higher risk for hepatitis B, you should be screened for this virus. Talk with your health care provider to find out if you are at risk for hepatitis B infection.  Hepatitis C  Testing is recommended for:  • Everyone born from 1945 through 1965.  • Anyone with known risk factors for hepatitis C.  Sexually transmitted infections (STIs)  • Get screened for STIs, including gonorrhea and chlamydia, if:  ? You are sexually active and are younger than 24 years of age.  ? You are older than 24 years of age and your  health care provider tells you that you are at risk for this type of infection.  ? Your sexual activity has changed since you were last screened, and you are at increased risk for chlamydia or gonorrhea. Ask your health care provider if you are at risk.  • Ask your health care provider about whether you are at high risk for HIV. Your health care provider may recommend a prescription medicine to help prevent HIV infection. If you choose to take medicine to prevent HIV, you should first get tested for HIV. You should then be tested every 3 months for as long as you are taking the medicine.  Pregnancy  • If you are about to stop having your period (premenopausal) and you may become pregnant, seek counseling before you get pregnant.  • Take 400 to 800 micrograms (mcg) of folic acid every day if you become pregnant.  • Ask for birth control (contraception) if you want to prevent pregnancy.  Osteoporosis and menopause  Osteoporosis is a disease in which the bones lose minerals and strength with aging. This can result in bone fractures. If you are 65 years old or older, or if you are at risk for osteoporosis and fractures, ask your health care provider if you should:  • Be screened for bone loss.  • Take a calcium or vitamin D supplement to lower your risk of fractures.  • Be given hormone replacement therapy (HRT) to treat symptoms of menopause.  Follow these instructions at home:  Alcohol use  • Do not drink alcohol if:  ? Your health care provider tells you not to drink.  ? You are pregnant, may be pregnant, or are planning to become pregnant.  • If you drink alcohol:  ? Limit how much you have to:  - 0-1 drink a day.  ? Know how much alcohol is in your drink. In the [place] one drink equals one 12 oz bottle of beer (355 mL), one 5 oz glass of wine (148 mL), or one 1½ oz glass of hard liquor (44 mL).  Lifestyle  • Do not use any products that contain nicotine or tobacco. These products include cigarettes, chewing tobacco,  "and vaping devices, such as e-cigarettes. If you need help quitting, ask your health care provider.  • Do not use street drugs.  • Do not share needles.  • Ask your health care provider for help if you need support or information about quitting drugs.  General instructions  • Schedule regular health, dental, and eye exams.  • Stay current with your vaccines.  • Tell your health care provider if:  ? You often feel depressed.  ? You have ever been abused or do not feel safe at home.  Summary  • Adopting a healthy lifestyle and getting preventive care are important in promoting health and wellness.  • Follow your health care provider's instructions about healthy diet, exercising, and getting tested or screened for diseases.  • Follow your health care provider's instructions on monitoring your cholesterol and blood pressure.  She exercises regularly: no.  She wears her seat belt: yes.  She has concerns about domestic violence: no.    GYN screening history:  · Last pap: she reports her last PAP was normal  · Last mammogram: she reports her last mammogram was normal.    No Additional Complaints Reported    The following portions of the patient's history were reviewed and updated as appropriate:vital signs, allergies, current medications, past medical history, past social history, past surgical history and problem list.    Review of Systems  Pertinent items are noted in HPI.     Physical Exam    Objective     /66   Ht 167.6 cm (66\")   Wt 93.9 kg (207 lb)   Breastfeeding No   BMI 33.41 kg/m²       General:  well developed; well nourished  no acute distress   Constitutional: healthy   Skin:  No suspicious lesions seen   Thyroid: normal to inspection and palpation   Lungs:  breathing is unlabored  clear to auscultation bilaterally   Heart:  regular rate and rhythm, S1, S2 normal, no murmur, click, rub or gallop   Breasts:  Examined in supine position  Symmetric without masses or skin dimpling  Nipples normal without " inversion, lesions or discharge  There are no palpable axillary nodes   Abdomen: soft, non-tender; no masses  no umbilical or inginual hernias are present  no hepato-splenomegaly   Pelvis: Clinical staff was present for exam  External genitalia:  normal appearance of the external genitalia including Bartholin's and Dulac's glands.  :  urethral meatus normal; urethral hypermobility is absent.  Vaginal:  normal pink mucosa without prolapse or lesions.  Cervix:  normal appearance IUD string present - 3 cms in length;  Uterus:  ULNS. anteverted  Adnexa:  normal bimanual exam of the adnexa.   Musculoskeletal: negative   Neuro: normal without focal findings, mental status, speech normal, alert and oriented x3 and HENRRY   Psych: oriented to time, place and person, mood and affect are within normal limits, pt is a good historian; no memory problems were noted       Lab Review   CBC, CMP, PAP and TSH    Imaging  No data reviewed    Assessment & Plan     ASSESSMENT  1. Encounter for annual routine gynecological examination        PLAN  Orders Placed This Encounter   Procedures   • Cologuard - Stool, Per Rectum     Standing Status:   Future     Standing Expiration Date:   3/13/2024     Order Specific Question:   Release to patient     Answer:   Routine Release     No orders of the defined types were placed in this encounter.        Follow up: 1 year(s) Annual Needs appt for IUD exchange         This note was electronically signed.    Malik Haji MD  March 13, 2023

## 2023-05-19 ENCOUNTER — HOSPITAL ENCOUNTER (OUTPATIENT)
Dept: MAMMOGRAPHY | Facility: HOSPITAL | Age: 48
Discharge: HOME | End: 2023-05-19
Admitting: OBSTETRICS & GYNECOLOGY
Payer: COMMERCIAL

## 2023-05-19 DIAGNOSIS — Z12.39 BREAST SCREENING: ICD-10-CM

## 2023-05-19 PROCEDURE — 77063 BREAST TOMOSYNTHESIS BI: CPT

## 2023-05-19 PROCEDURE — 77067 SCR MAMMO BI INCL CAD: CPT

## 2023-05-31 ENCOUNTER — OFFICE VISIT (OUTPATIENT)
Dept: ENDOCRINOLOGY | Facility: CLINIC | Age: 48
End: 2023-05-31

## 2023-05-31 VITALS
SYSTOLIC BLOOD PRESSURE: 118 MMHG | DIASTOLIC BLOOD PRESSURE: 74 MMHG | WEIGHT: 207 LBS | BODY MASS INDEX: 33.27 KG/M2 | OXYGEN SATURATION: 99 % | HEIGHT: 66 IN | HEART RATE: 68 BPM

## 2023-05-31 DIAGNOSIS — E89.0 POSTOPERATIVE HYPOTHYROIDISM: Primary | ICD-10-CM

## 2023-05-31 DIAGNOSIS — C73 FOLLICULAR THYROID CANCER: ICD-10-CM

## 2023-05-31 PROCEDURE — 84439 ASSAY OF FREE THYROXINE: CPT | Performed by: INTERNAL MEDICINE

## 2023-05-31 PROCEDURE — 84432 ASSAY OF THYROGLOBULIN: CPT | Performed by: INTERNAL MEDICINE

## 2023-05-31 PROCEDURE — 86800 THYROGLOBULIN ANTIBODY: CPT | Performed by: INTERNAL MEDICINE

## 2023-05-31 PROCEDURE — 84443 ASSAY THYROID STIM HORMONE: CPT | Performed by: INTERNAL MEDICINE

## 2023-05-31 NOTE — PROGRESS NOTES
"Chief Complaint   Patient presents with   • Follow-up     6 mo FU        HPI   Leanna Laboy is a 47 y.o. female had concerns including Follow-up (6 mo FU).      Feeling good. Is on levothyroxine 125 mcg daily.   Due for neck US today. No issues.     The following portions of the patient's history were reviewed and updated as appropriate: allergies, current medications, past family history, past medical history, past social history, past surgical history and problem list.    Review of Systems   Constitutional: Negative.    Endocrine: Negative.         /74 (BP Location: Left arm)   Pulse 68   Ht 167.6 cm (65.98\")   Wt 93.9 kg (207 lb)   SpO2 99%   BMI 33.43 kg/m²      Physical Exam  Vitals reviewed.   Constitutional:       Appearance: Normal appearance.   Cardiovascular:      Rate and Rhythm: Normal rate.   Pulmonary:      Effort: Pulmonary effort is normal.   Neurological:      General: No focal deficit present.      Mental Status: She is alert. Mental status is at baseline.   Psychiatric:         Mood and Affect: Mood normal.         Behavior: Behavior normal.              LABS AND IMAGING   CMP  Lab Results   Component Value Date    GLUCOSE 87 09/23/2022    BUN 11 09/23/2022    CREATININE 0.94 09/23/2022    BCR 11.7 09/23/2022    K 3.7 09/23/2022    CO2 25.6 09/23/2022    CALCIUM 9.8 09/23/2022    ALBUMIN 5.00 09/23/2022    AST 18 09/23/2022    ALT 17 09/23/2022        CBC w/DIFF   Lab Results   Component Value Date    WBC 7.44 09/23/2022    RBC 4.68 09/23/2022    HGB 13.8 09/23/2022    HCT 40.1 09/23/2022    MCV 85.7 09/23/2022    MCH 29.5 09/23/2022    MCHC 34.4 09/23/2022    RDW 13.3 09/23/2022    RDWSD 41.8 09/23/2022    MPV 11.8 09/23/2022     09/23/2022     TSH  Lab Results   Component Value Date    TSH 0.857 02/24/2023    TSH 2.160 11/28/2022    TSH 0.958 02/24/2022    TSH 8.540 (H) 08/26/2021    TSH 3.990 05/28/2021    TSH 0.132 (L) 02/25/2021     T4  Lab Results   Component Value Date "    FREET4 1.51 2023    FREET4 1.31 2022    FREET4 1.49 2022   T3  Lab Results   Component Value Date    T3FREE 3.21 2020     Lab Results   Component Value Date    THYROGLOBULN 0.6 (L) 2022    THYROGLOBULN 1.0 (L) 2021    THYROGLOBULN 0.8 (L) 2020       Lab Results   Component Value Date    THGAB <1.0 2022    THGAB <1.0 2021    THGAB <1.0 2020     Post-operative Neck Ultrasound  [facility]     PT: Leanna Laboy  : 1975  Date:  21  Indication: History of thyroid cancer.     Technique: Real time high resolution imaging of the anterior neck was performed in longitudinal and transverse planes.     Findings:   Compartment / Level I  (Submandibular): no abnormal lymph nodules bilaterally  Compartment / Level II  (Suprahyoid parajugular): no abnormal lymph nodules bilaterally  Compartment / Level III  (Infrahyoid supracricoid parajugular): no abnormal lymph nodules bilaterally  Compartment / Level IV (Infracricoid parajugular): no abnormal lymph nodules bilaterally  Compartment / Level V (Posterior cervical triangle): no abnormal lymph nodules bilaterally  Compartment / Level VI (Central compartment): no abnormal lymph nodules identified  Compartment / Level VII (Substernal space): no abnormal lymph nodules bilaterally     Impression: The structures of the neck are shifted medially as expected post thyroidectomy. The thyroid bed is unremarkable.   No abnormal lymph nodes were identified in surgical anatomic compartments I - VII.       Recommendation: Repeat Neck US in 1 year.           Post-operative Neck Ultrasound  [facility]     PT: Leanna Laboy  : 1975  Date:  22  Indication: History of thyroid cancer.     Technique: Real time high resolution imaging of the anterior neck was performed in longitudinal and transverse planes.     Findings:   Compartment / Level I  (Submandibular): no abnormal  lymph nodules bilaterally  Compartment / Level II  (Suprahyoid parajugular): no abnormal lymph nodules bilaterally  Compartment / Level III  (Infrahyoid supracricoid parajugular): no abnormal lymph nodules bilaterally  Compartment / Level IV (Infracricoid parajugular): no abnormal lymph nodules bilaterally  Compartment / Level V (Posterior cervical triangle): no abnormal lymph nodules bilaterally  Compartment / Level VI (Central compartment): no abnormal lymph nodules identified  Compartment / Level VII (Substernal space): no abnormal lymph nodules bilaterally     Impression: The structures of the neck are shifted medially as expected post thyroidectomy. The thyroid bed is unremarkable.   No abnormal lymph nodes were identified in surgical anatomic compartments I - VII.       Recommendation: Repeat Neck US recommended as clinically indicated.     Post-operative Neck Ultrasound  [facility]    PT: Leanna Laboy  : 1975  Date:  23  Indication: History of thyroid cancer.    Technique: Real time high resolution imaging of the anterior neck was performed in longitudinal and transverse planes.    Findings:   Compartment / Level I  (Submandibular): no abnormal lymph nodules bilaterally  Compartment / Level II  (Suprahyoid parajugular): no abnormal lymph nodules bilaterally  Compartment / Level III  (Infrahyoid supracricoid parajugular): no abnormal lymph nodules bilaterally  Compartment / Level IV (Infracricoid parajugular): no abnormal lymph nodules bilaterally  Compartment / Level V (Posterior cervical triangle): no abnormal lymph nodules bilaterally  Compartment / Level VI (Central compartment): no abnormal lymph nodules identified  Compartment / Level VII (Substernal space): no abnormal lymph nodules bilaterally    Impression: The structures of the neck are shifted medially as expected post thyroidectomy. The left thyroid bed is unremarkable. The right thyroid bed has a 5 x 5 x 4 mm  hyperechoic solid nodule/area of scar tissue. Solid, clear margins, no calcifications, grade 1 vascularity.  No abnormal lymph nodes were identified in surgical anatomic compartments I - VII.      Recommendation: Repeat Neck US 1 year.                Assessment and Plan    Diagnoses and all orders for this visit:    1. Postoperative hypothyroidism (Primary)  TSH target low/normal. Repeat today. Clinically euthyroid on levothyroxine 125 mcg daily.   -     T4, Free  -     TSH  -     Thyroglobulin Antibody and Thyroglobulin, TERRIE or LC/MS-MS    2. Follicular thyroid cancer  Left lobectomy 1/14/2020 showed a minimally invasive, well differentiated 4.2 x 4.0 x 3.0 cm follicular thyroid carcinoma with microscopic focal capsular invasion, no vascular invasion, no extrathyroidal extension, negative margins.  No lymph nodes were submitted. Completion thyroidectomy on 6/30/2020 and the right lobe was benign. No ARANDA.  Neck ultrasound 2/2021 and 2/2022 with no remnant thyroid tissue no suspicious lymphadenopathy.  Neck US today with 5 mm hyperechoic nodule/scar tissue. No suspicious features. No Lymphadenopathy.  Repeat US in 1 year.  TG levels last 0.6. Repeat today. Highest was 1.0 when TSH was elevated (8.54).   -     US Thyroid         Return in about 1 year (around 5/31/2024) for next scheduled follow up, with thyroid ultrasound ** 30 min appt. The patient was instructed to contact the clinic with any interval questions or concerns.    Michelle Jimenez,    Endocrinologist    Please note that portions of this note were completed with a voice recognition program.

## 2023-06-01 DIAGNOSIS — E89.0 POSTOPERATIVE HYPOTHYROIDISM: ICD-10-CM

## 2023-06-01 LAB
T4 FREE SERPL-MCNC: 1.36 NG/DL (ref 0.93–1.7)
TSH SERPL DL<=0.05 MIU/L-ACNC: 0.79 UIU/ML (ref 0.27–4.2)

## 2023-06-01 RX ORDER — LEVOTHYROXINE SODIUM 0.12 MG/1
125 TABLET ORAL DAILY
Qty: 90 TABLET | Refills: 3 | Status: SHIPPED | OUTPATIENT
Start: 2023-06-01 | End: 2024-05-31

## 2023-06-02 LAB
THYROGLOB AB SERPL-ACNC: <1 IU/ML (ref 0–0.9)
THYROGLOB SERPL-MCNC: 0.5 NG/ML (ref 1.5–38.5)

## 2023-06-26 PROBLEM — Z30.433 ENCOUNTER FOR REMOVAL AND REINSERTION OF INTRAUTERINE CONTRACEPTIVE DEVICE (IUD): Status: ACTIVE | Noted: 2023-06-26

## 2023-07-24 ENCOUNTER — HOSPITAL ENCOUNTER (OUTPATIENT)
Dept: MAMMOGRAPHY | Facility: HOSPITAL | Age: 48
Discharge: HOME | End: 2023-07-24
Admitting: RADIOLOGY
Payer: COMMERCIAL

## 2023-07-24 DIAGNOSIS — R92.8 ABNORMAL MAMMOGRAM: ICD-10-CM

## 2023-07-24 PROCEDURE — 77062 BREAST TOMOSYNTHESIS BI: CPT | Performed by: RADIOLOGY

## 2023-07-24 PROCEDURE — G0279 TOMOSYNTHESIS, MAMMO: HCPCS

## 2023-07-24 PROCEDURE — 77066 DX MAMMO INCL CAD BI: CPT | Performed by: RADIOLOGY

## 2023-07-24 PROCEDURE — 77066 DX MAMMO INCL CAD BI: CPT

## 2024-03-18 ENCOUNTER — OFFICE VISIT (OUTPATIENT)
Dept: OBSTETRICS AND GYNECOLOGY | Facility: CLINIC | Age: 49
End: 2024-03-18
Payer: COMMERCIAL

## 2024-03-18 VITALS
BODY MASS INDEX: 32.62 KG/M2 | DIASTOLIC BLOOD PRESSURE: 72 MMHG | SYSTOLIC BLOOD PRESSURE: 120 MMHG | HEIGHT: 66 IN | WEIGHT: 203 LBS

## 2024-03-18 DIAGNOSIS — Z01.419 ENCOUNTER FOR ANNUAL ROUTINE GYNECOLOGICAL EXAMINATION: Primary | ICD-10-CM

## 2024-03-18 DIAGNOSIS — Z12.31 ENCOUNTER FOR SCREENING MAMMOGRAM FOR MALIGNANT NEOPLASM OF BREAST: ICD-10-CM

## 2024-03-18 PROCEDURE — 99396 PREV VISIT EST AGE 40-64: CPT | Performed by: OBSTETRICS & GYNECOLOGY

## 2024-03-18 NOTE — PROGRESS NOTES
Subjective     Chief Complaint   Patient presents with    Gynecologic Exam     Annual        Leannasea Laboy is a 48 y.o. year old  presenting to be seen for her annual exam.      Her LMP was No LMP recorded. Patient has had an implant..  Her cycles are infrequent.  Usually her flow has been absent with no more than 0 days of heavy flow each menses.   Additionally she describes no other symptoms associated with her menses.    She is not sexually active.       Current contraceptive methods being used: IUD - Mirena.  In the coming year, she is not considering changing her current contraceptive method.    She exercises regularly: no.  She wears her seat belt: yes.  She has concerns about domestic violence: no.    GYN screening history:  Last pap: she reports her last PAP was normal  Last mammogram: she reports her last mammogram was normal  Last fasting lipid profile: she reports her last lipid panel was normal  Last colonoscopy: negative cologard   Last DEXA: she has never had a DEXA.  Health Maintenance, Female  Adopting a healthy lifestyle and getting preventive care are important in promoting health and wellness. Ask your health care provider about:  The right schedule for you to have regular tests and exams.  Things you can do on your own to prevent diseases and keep yourself healthy.  What should I know about diet, weight, and exercise?  Eat a healthy diet    Eat a diet that includes plenty of vegetables, fruits, low-fat dairy products, and lean protein.  Do not eat a lot of foods that are high in solid fats, added sugars, or sodium.  Maintain a healthy weight  Body mass index (BMI) is used to identify weight problems. It estimates body fat based on height and weight. Your health care provider can help determine your BMI and help you achieve or maintain a healthy weight.  Get regular exercise  Get regular exercise. This is one of the most important things you can do for your health. Most adults  should:  Exercise for at least 150 minutes each week. The exercise should increase your heart rate and make you sweat (moderate-intensity exercise).  Do strengthening exercises at least twice a week. This is in addition to the moderate-intensity exercise.  Spend less time sitting. Even light physical activity can be beneficial.  Watch cholesterol and blood lipids  Have your blood tested for lipids and cholesterol at 20 years of age, then have this test every 5 years.  Have your cholesterol levels checked more often if:  Your lipid or cholesterol levels are high.  You are older than 40 years of age.  You are at high risk for heart disease.  What should I know about cancer screening?  Depending on your health history and family history, you may need to have cancer screening at various ages. This may include screening for:  Breast cancer.  Cervical cancer.  Colorectal cancer.  Skin cancer.  Lung cancer.  What should I know about heart disease, diabetes, and high blood pressure?  Blood pressure and heart disease  High blood pressure causes heart disease and increases the risk of stroke. This is more likely to develop in people who have high blood pressure readings or are overweight.  Have your blood pressure checked:  Every 3-5 years if you are 18-39 years of age.  Every year if you are 40 years old or older.  Diabetes  Have regular diabetes screenings. This checks your fasting blood sugar level. Have the screening done:  Once every three years after age 40 if you are at a normal weight and have a low risk for diabetes.  More often and at a younger age if you are overweight or have a high risk for diabetes.  What should I know about preventing infection?  Hepatitis B  If you have a higher risk for hepatitis B, you should be screened for this virus. Talk with your health care provider to find out if you are at risk for hepatitis B infection.  Hepatitis C  Testing is recommended for:  Everyone born from 1945 through  1965.  Anyone with known risk factors for hepatitis C.  Sexually transmitted infections (STIs)  Get screened for STIs, including gonorrhea and chlamydia, if:  You are sexually active and are younger than 24 years of age.  You are older than 24 years of age and your health care provider tells you that you are at risk for this type of infection.  Your sexual activity has changed since you were last screened, and you are at increased risk for chlamydia or gonorrhea. Ask your health care provider if you are at risk.  Ask your health care provider about whether you are at high risk for HIV. Your health care provider may recommend a prescription medicine to help prevent HIV infection. If you choose to take medicine to prevent HIV, you should first get tested for HIV. You should then be tested every 3 months for as long as you are taking the medicine.  Pregnancy  If you are about to stop having your period (premenopausal) and you may become pregnant, seek counseling before you get pregnant.  Take 400 to 800 micrograms (mcg) of folic acid every day if you become pregnant.  Ask for birth control (contraception) if you want to prevent pregnancy.  Osteoporosis and menopause  Osteoporosis is a disease in which the bones lose minerals and strength with aging. This can result in bone fractures. If you are 65 years old or older, or if you are at risk for osteoporosis and fractures, ask your health care provider if you should:  Be screened for bone loss.  Take a calcium or vitamin D supplement to lower your risk of fractures.  Be given hormone replacement therapy (HRT) to treat symptoms of menopause.  Follow these instructions at home:  Alcohol use  Do not drink alcohol if:  Your health care provider tells you not to drink.  You are pregnant, may be pregnant, or are planning to become pregnant.  If you drink alcohol:  Limit how much you have to:  0-1 drink a day.  Know how much alcohol is in your drink. In the [place] one drink  "equals one 12 oz bottle of beer (355 mL), one 5 oz glass of wine (148 mL), or one 1½ oz glass of hard liquor (44 mL).  Lifestyle  Do not use any products that contain nicotine or tobacco. These products include cigarettes, chewing tobacco, and vaping devices, such as e-cigarettes. If you need help quitting, ask your health care provider.  Do not use street drugs.  Do not share needles.  Ask your health care provider for help if you need support or information about quitting drugs.  General instructions  Schedule regular health, dental, and eye exams.  Stay current with your vaccines.  Tell your health care provider if:  You often feel depressed.  You have ever been abused or do not feel safe at home.  Summary  Adopting a healthy lifestyle and getting preventive care are important in promoting health and wellness.  Follow your health care provider's instructions about healthy diet, exercising, and getting tested or screened for diseases.  Follow your health care provider's instructions on monitoring your cholesterol and blood pressure.  No Additional Complaints Reported    The following portions of the patient's history were reviewed and updated as appropriate:vital signs, allergies, current medications, past medical history, past social history, past surgical history, and problem list.    Review of Systems  Pertinent items are noted in HPI.     Physical Exam    Objective     /72   Ht 167.6 cm (65.98\")   Wt 92.1 kg (203 lb)   Breastfeeding No   BMI 32.78 kg/m²       General:  well developed; well nourished  no acute distress  obese - Body mass index is 32.78 kg/m².   Constitutional: healthy   Skin:  No suspicious lesions seen   Thyroid: normal to inspection and palpation   Lungs:  breathing is unlabored  clear to auscultation bilaterally   Heart:  regular rate and rhythm, S1, S2 normal, no murmur, click, rub or gallop   Breasts:  Examined in supine position  Symmetric without masses or skin " dimpling  Nipples normal without inversion, lesions or discharge  There are no palpable axillary nodes  Stable reduction  mammoplasty incisions   Abdomen: soft, non-tender; no masses  no umbilical or inginual hernias are present  no hepato-splenomegaly   Pelvis: Clinical staff was present for exam  External genitalia:  normal appearance of the external genitalia including Bartholin's and [place]'s glands.  :  urethral meatus normal; urethral hypermobility is absent.  Vaginal:  normal pink mucosa without prolapse or lesions.  Cervix:  normal appearance IUD string present - 2.5 cms in length;  Uterus:  normal size, shape and consistency  Adnexa:  normal bimanual exam of the adnexa.   Musculoskeletal: negative   Neuro: normal without focal findings, mental status, speech normal, alert and oriented x3, and HENRRY   Psych: oriented to time, place and person, mood and affect are within normal limits, pt is a good historian; no memory problems were noted       Lab Review   PAP    Imaging  Mammogram report    Assessment & Plan     ASSESSMENT  1. Encounter for annual routine gynecological examination    2. Encounter for screening mammogram for malignant neoplasm of breast        PLAN  Orders Placed This Encounter   Procedures    Mammo Screening Digital Tomosynthesis Bilateral With CAD     Standing Status:   Future     Standing Expiration Date:   3/18/2025     Order Specific Question:   Reason for Exam:     Answer:   screen     Order Specific Question:   Release to patient     Answer:   Routine Release [[id number]]     No orders of the defined types were placed in this encounter.        Follow up: 1 year(s)         This note was electronically signed.    Malik Haji MD  March 18, 2024

## 2024-06-04 DIAGNOSIS — E89.0 POSTOPERATIVE HYPOTHYROIDISM: ICD-10-CM

## 2024-06-04 NOTE — TELEPHONE ENCOUNTER
Rx Refill Note  Requested Prescriptions     Pending Prescriptions Disp Refills    levothyroxine (SYNTHROID, LEVOTHROID) 125 MCG tablet [Pharmacy Med Name: Levothyroxine Sodium 125 MCG Oral Tablet] 90 tablet 0     Sig: Take 1 tablet by mouth once daily          Last office visit with prescribing clinician: 5/31/2023     Next office visit with prescribing clinician: Visit date not found         Hazel Ridley MA  06/04/24, 10:15 EDT

## 2024-06-04 NOTE — TELEPHONE ENCOUNTER
Rx Refill Note  Requested Prescriptions     Pending Prescriptions Disp Refills    levothyroxine (SYNTHROID, LEVOTHROID) 125 MCG tablet [Pharmacy Med Name: Levothyroxine Sodium 125 MCG Oral Tablet] 90 tablet 0     Sig: Take 1 tablet by mouth once daily          Last office visit with prescribing clinician: 5/31/2023     Next office visit with prescribing clinician: 1/2/25        Hazel Ridley MA  06/04/24, 11:02 EDT

## 2024-06-05 DIAGNOSIS — E89.0 POSTOPERATIVE HYPOTHYROIDISM: ICD-10-CM

## 2024-06-05 RX ORDER — LEVOTHYROXINE SODIUM 0.12 MG/1
125 TABLET ORAL DAILY
Qty: 90 TABLET | Refills: 0 | Status: SHIPPED | OUTPATIENT
Start: 2024-06-05

## 2024-06-06 ENCOUNTER — HOSPITAL ENCOUNTER (OUTPATIENT)
Dept: MAMMOGRAPHY | Facility: HOSPITAL | Age: 49
Discharge: HOME | End: 2024-06-06
Admitting: OBSTETRICS & GYNECOLOGY
Payer: COMMERCIAL

## 2024-06-06 DIAGNOSIS — Z12.31 ENCOUNTER FOR SCREENING MAMMOGRAM FOR MALIGNANT NEOPLASM OF BREAST: ICD-10-CM

## 2024-06-06 PROCEDURE — 77063 BREAST TOMOSYNTHESIS BI: CPT

## 2024-06-06 PROCEDURE — 77067 SCR MAMMO BI INCL CAD: CPT

## 2024-08-29 DIAGNOSIS — E89.0 POSTOPERATIVE HYPOTHYROIDISM: ICD-10-CM

## 2024-08-29 RX ORDER — LEVOTHYROXINE SODIUM 125 UG/1
125 TABLET ORAL DAILY
Qty: 90 TABLET | Refills: 1 | Status: SHIPPED | OUTPATIENT
Start: 2024-08-29

## 2024-08-29 NOTE — TELEPHONE ENCOUNTER
Rx Refill Note  Requested Prescriptions     Pending Prescriptions Disp Refills    levothyroxine (SYNTHROID, LEVOTHROID) 125 MCG tablet [Pharmacy Med Name: Levothyroxine Sodium 125 MCG Oral Tablet] 90 tablet 0     Sig: Take 1 tablet by mouth once daily      Last office visit with prescribing clinician: 5/31/2023     Next office visit with prescribing clinician: 1/2/2025

## 2024-11-08 RX ORDER — LEVOTHYROXINE SODIUM 125 UG/1
125 TABLET ORAL DAILY
Qty: 90 TABLET | Refills: 0 | Status: SHIPPED | OUTPATIENT
Start: 2024-11-08

## 2025-01-02 ENCOUNTER — OFFICE VISIT (OUTPATIENT)
Dept: ENDOCRINOLOGY | Facility: CLINIC | Age: 50
End: 2025-01-02
Payer: COMMERCIAL

## 2025-01-02 VITALS
DIASTOLIC BLOOD PRESSURE: 64 MMHG | WEIGHT: 209 LBS | BODY MASS INDEX: 33.59 KG/M2 | OXYGEN SATURATION: 100 % | HEART RATE: 70 BPM | HEIGHT: 66 IN | SYSTOLIC BLOOD PRESSURE: 120 MMHG

## 2025-01-02 DIAGNOSIS — E89.0 POSTOPERATIVE HYPOTHYROIDISM: Primary | ICD-10-CM

## 2025-01-02 DIAGNOSIS — E89.0 POSTOPERATIVE HYPOTHYROIDISM: ICD-10-CM

## 2025-01-02 DIAGNOSIS — C73 THYROID CANCER: ICD-10-CM

## 2025-01-02 LAB
T4 FREE SERPL-MCNC: 1.6 NG/DL (ref 0.92–1.68)
TSH SERPL DL<=0.05 MIU/L-ACNC: 1.22 UIU/ML (ref 0.27–4.2)

## 2025-01-02 PROCEDURE — 86800 THYROGLOBULIN ANTIBODY: CPT | Performed by: INTERNAL MEDICINE

## 2025-01-02 PROCEDURE — 84432 ASSAY OF THYROGLOBULIN: CPT | Performed by: INTERNAL MEDICINE

## 2025-01-02 PROCEDURE — 84439 ASSAY OF FREE THYROXINE: CPT | Performed by: INTERNAL MEDICINE

## 2025-01-02 PROCEDURE — 84443 ASSAY THYROID STIM HORMONE: CPT | Performed by: INTERNAL MEDICINE

## 2025-01-02 RX ORDER — LEVOTHYROXINE SODIUM 125 UG/1
125 TABLET ORAL DAILY
Qty: 90 TABLET | Refills: 3 | Status: SHIPPED | OUTPATIENT
Start: 2025-01-02

## 2025-01-02 NOTE — PROGRESS NOTES
"Chief Complaint   Patient presents with    Hypothyroidism        HPI   Leanna Laboy is a 49 y.o. female had concerns including Hypothyroidism.     Here for follow-up and due for labs and an ultrasound.     Is on levothyroxine 125 mcg daily.      Taking as directed. Feels well.     The following portions of the patient's history were reviewed and updated as appropriate: allergies, current medications, past family history, past medical history, past social history, past surgical history, and problem list.    Review of Systems   Constitutional:  Negative for chills, diaphoresis, fatigue and fever.   HENT:  Negative for congestion, sore throat and swollen glands.    Respiratory:  Negative for cough.    Cardiovascular:  Negative for chest pain.   Gastrointestinal:  Negative for abdominal pain, nausea and vomiting.   Genitourinary:  Negative for dysuria.   Musculoskeletal:  Negative for myalgias and neck pain.   Skin:  Negative for rash.   Neurological:  Positive for numbness. Negative for weakness.        /64 (BP Location: Left arm, Patient Position: Sitting, Cuff Size: Adult)   Pulse 70   Ht 167.4 cm (65.9\")   Wt 94.8 kg (209 lb)   SpO2 100%   BMI 33.84 kg/m²      Physical Exam  Vitals reviewed.   Constitutional:       Appearance: Normal appearance.   Cardiovascular:      Rate and Rhythm: Normal rate.   Pulmonary:      Effort: Pulmonary effort is normal.   Neurological:      General: No focal deficit present.      Mental Status: She is alert. Mental status is at baseline.   Psychiatric:         Mood and Affect: Mood normal.         Behavior: Behavior normal.              LABS AND IMAGING   CMP  Lab Results   Component Value Date    GLUCOSE 87 09/23/2022    BUN 11 09/23/2022    CREATININE 0.94 09/23/2022    BCR 11.7 09/23/2022    K 3.7 09/23/2022    CO2 25.6 09/23/2022    CALCIUM 9.8 09/23/2022    ALBUMIN 5.00 09/23/2022    AST 18 09/23/2022    ALT 17 09/23/2022      CBC w/DIFF   Lab Results   Component " Value Date    WBC 7.44 2022    RBC 4.68 2022    HGB 13.8 2022    HCT 40.1 2022    MCV 85.7 2022    MCH 29.5 2022    MCHC 34.4 2022    RDW 13.3 2022    RDWSD 41.8 2022    MPV 11.8 2022     2022     TSH  Lab Results   Component Value Date    TSH 0.794 2023    TSH 0.857 2023    TSH 2.160 2022     T4  Lab Results   Component Value Date    FREET4 1.36 2023    FREET4 1.51 2023    FREET4 1.31 2022     T3  Lab Results   Component Value Date    T3FREE 3.21 2020     Lab Results   Component Value Date    THYROGLOBULN 0.5 (L) 2023    THYROGLOBULN 0.6 (L) 2022    THYROGLOBULN 1.0 (L) 2021    THYROGLOBULN 0.8 (L) 2020     Lab Results   Component Value Date    THGAB <1.0 2023    THGAB <1.0 2022    THGAB <1.0 2021    THGAB <1.0 2020     Post-operative Neck Ultrasound  [facility]     PT: Leanna Laboy  : 1975  Date:  23  Indication: History of thyroid cancer.     Technique: Real time high resolution imaging of the anterior neck was performed in longitudinal and transverse planes.     Findings:   Compartment / Level I  (Submandibular): no abnormal lymph nodules bilaterally  Compartment / Level II  (Suprahyoid parajugular): no abnormal lymph nodules bilaterally  Compartment / Level III  (Infrahyoid supracricoid parajugular): no abnormal lymph nodules bilaterally  Compartment / Level IV (Infracricoid parajugular): no abnormal lymph nodules bilaterally  Compartment / Level V (Posterior cervical triangle): no abnormal lymph nodules bilaterally  Compartment / Level VI (Central compartment): no abnormal lymph nodules identified  Compartment / Level VII (Substernal space): no abnormal lymph nodules bilaterally     Impression: The structures of the neck are shifted medially as expected post thyroidectomy. The left thyroid bed is unremarkable. The  right thyroid bed has a 5 x 5 x 4 mm hyperechoic solid nodule/area of scar tissue. Solid, clear margins, no calcifications, grade 1 vascularity.  No abnormal lymph nodes were identified in surgical anatomic compartments I - VII.       Recommendation: Repeat Neck US 1 year.        Post-operative Neck Ultrasound  [facility]    PT: Leanna Laboy  : 1975  Date:  25  Indication: History of thyroid cancer.    Technique: Real time high resolution imaging of the anterior neck was performed in longitudinal and transverse planes.    Findings:   Compartment / Level I  (Submandibular): no abnormal lymph nodules bilaterally  Compartment / Level II  (Suprahyoid parajugular): no abnormal lymph nodules bilaterally  Compartment / Level III  (Infrahyoid supracricoid parajugular): no abnormal lymph nodules bilaterally  Compartment / Level IV (Infracricoid parajugular): no abnormal lymph nodules bilaterally  Compartment / Level V (Posterior cervical triangle): no abnormal lymph nodules bilaterally  Compartment / Level VI (Central compartment): no abnormal lymph nodules identified  Compartment / Level VII (Substernal space): no abnormal lymph nodules bilaterally    Impression: The structures of the neck are shifted medially as expected post thyroidectomy. The left thyroid bed is unremarkable. Right thyroid bed with 0.4 x 0.4 cm hyperechoic nodule/scar tissue. Similar appearance to the last ultrasound.   No abnormal lymph nodes were identified in surgical anatomic compartments I - VII.      Recommendation: Repeat Neck US 1 year.          Assessment and Plan    Diagnoses and all orders for this visit:    1. Postoperative hypothyroidism (Primary)  TSH target low/normal. Repeat today. Clinically euthyroid on levothyroxine 125 mcg daily.   -     T4, Free  -     TSH    2. 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  No lymph nodes were submitted. Completion thyroidectomy on 6/30/2020 and the right lobe was benign. No ARANDA.  Neck ultrasound 2/2021 and 2/2022 with no remnant thyroid tissue no suspicious lymphadenopathy.  Neck US 5/2023 with 5 mm hyperechoic nodule/scar tissue. No suspicious features. No lymphadenopathy.  Neck US today with 4 mm hyperechoic nodule/scar tissue.   Repeat US yearly.  TG levels last 0.5, stable-trending down from prior of 0.6. Repeat today. Highest was 1.0 when TSH was elevated (8.54).   -     Thyroglobulin Antibody and Thyroglobulin, TERRIE or LC/MS-MS  -     US Thyroid         Return in about 1 year (around 1/2/2026) for next scheduled follow up, with thyroid ultrasound ** 30 min appt. The patient was instructed to contact the clinic with any interval questions or concerns.    Electronically signed by: Michelle Jimenez DO   Endocrinologist    Please note that portions of this note were completed with a voice recognition program.

## 2025-01-03 LAB
THYROGLOB AB SERPL-ACNC: <1 IU/ML (ref 0–0.9)
THYROGLOB SERPL-MCNC: 0.7 NG/ML (ref 1.5–38.5)

## 2025-03-20 ENCOUNTER — OFFICE VISIT (OUTPATIENT)
Dept: OBSTETRICS AND GYNECOLOGY | Facility: CLINIC | Age: 50
End: 2025-03-20
Payer: COMMERCIAL

## 2025-03-20 VITALS
SYSTOLIC BLOOD PRESSURE: 120 MMHG | WEIGHT: 205 LBS | DIASTOLIC BLOOD PRESSURE: 76 MMHG | HEIGHT: 66 IN | BODY MASS INDEX: 32.95 KG/M2

## 2025-03-20 DIAGNOSIS — Z12.31 ENCOUNTER FOR SCREENING MAMMOGRAM FOR MALIGNANT NEOPLASM OF BREAST: ICD-10-CM

## 2025-03-20 DIAGNOSIS — Z01.419 ENCOUNTER FOR ANNUAL ROUTINE GYNECOLOGICAL EXAMINATION: Primary | ICD-10-CM

## 2025-03-20 NOTE — PROGRESS NOTES
Subjective     Chief Complaint   Patient presents with    Gynecologic Exam     Annual          Leannasea Laboy is a 49 y.o. year old  presenting to be seen for her annual exam.      Her LMP was No LMP recorded. Patient has had an implant..  Her cycles are  rare to absent .  Usually her flow has been absent with no more than 0 days of heavy flow each menses.   Additionally she describes no other symptoms associated with her menses.    She is not sexually active.       Current contraceptive methods being used: IUD - Mirena.  In the coming year, she is not considering changing her current contraceptive method.    She exercises regularly: no.  She wears her seat belt: yes.  She has concerns about domestic violence: no.    GYN screening history:  Last pap: she reports her last PAP was normal  Last mammogram: she reports her last mammogram was normal  Last colonoscopy: she reports her last colonoscopy was normal.    No Additional Complaints Reported  Health Maintenance, Female  Adopting a healthy lifestyle and getting preventive care are important in promoting health and wellness. Ask your health care provider about:  The right schedule for you to have regular tests and exams.  Things you can do on your own to prevent diseases and keep yourself healthy.  What should I know about diet, weight, and exercise?  Eat a healthy diet  Eat a diet that includes plenty of vegetables, fruits, low-fat dairy products, and lean protein.  Do not eat a lot of foods that are high in solid fats, added sugars, or sodium.  Maintain a healthy weight  Body mass index (BMI) is used to identify weight problems. It estimates body fat based on height and weight. Your health care provider can help determine your BMI and help you achieve or maintain a healthy weight.  Get regular exercise  Get regular exercise. This is one of the most important things you can do for your health. Most adults should:  Exercise for at least 150 minutes each week.  The exercise should increase your heart rate and make you sweat (moderate-intensity exercise).  Do strengthening exercises at least twice a week. This is in addition to the moderate-intensity exercise.  Spend less time sitting. Even light physical activity can be beneficial.  Watch cholesterol and blood lipids  Have your blood tested for lipids and cholesterol at 20 years of age, then have this test every 5 years.  Have your cholesterol levels checked more often if:  Your lipid or cholesterol levels are high.  You are older than 40 years of age.  You are at high risk for heart disease.  What should I know about cancer screening?  Depending on your health history and family history, you may need to have cancer screening at various ages. This may include screening for:  Breast cancer.  Cervical cancer.  Colorectal cancer.  Skin cancer.  Lung cancer.  What should I know about heart disease, diabetes, and high blood pressure?  Blood pressure and heart disease  High blood pressure causes heart disease and increases the risk of stroke. This is more likely to develop in people who have high blood pressure readings or are overweight.  Have your blood pressure checked:  Every 3-5 years if you are 18-39 years of age.  Every year if you are 40 years old or older.  Diabetes  Have regular diabetes screenings. This checks your fasting blood sugar level. Have the screening done:  Once every three years after age 40 if you are at a normal weight and have a low risk for diabetes.  More often and at a younger age if you are overweight or have a high risk for diabetes.  What should I know about preventing infection?  Hepatitis B  If you have a higher risk for hepatitis B, you should be screened for this virus. Talk with your health care provider to find out if you are at risk for hepatitis B infection.  Hepatitis C  Testing is recommended for:  Everyone born from 1945 through 1965.  Anyone with known risk factors for hepatitis  C.  Sexually transmitted infections (STIs)  Get screened for STIs, including gonorrhea and chlamydia, if:  You are sexually active and are younger than 24 years of age.  You are older than 24 years of age and your health care provider tells you that you are at risk for this type of infection.  Your sexual activity has changed since you were last screened, and you are at increased risk for chlamydia or gonorrhea. Ask your health care provider if you are at risk.  Ask your health care provider about whether you are at high risk for HIV. Your health care provider may recommend a prescription medicine to help prevent HIV infection. If you choose to take medicine to prevent HIV, you should first get tested for HIV. You should then be tested every 3 months for as long as you are taking the medicine.  Pregnancy  If you are about to stop having your period (premenopausal) and you may become pregnant, seek counseling before you get pregnant.  Take 400 to 800 micrograms (mcg) of folic acid every day if you become pregnant.  Ask for birth control (contraception) if you want to prevent pregnancy.  Osteoporosis and menopause  Osteoporosis is a disease in which the bones lose minerals and strength with aging. This can result in bone fractures. If you are 65 years old or older, or if you are at risk for osteoporosis and fractures, ask your health care provider if you should:  Be screened for bone loss.  Take a calcium or vitamin D supplement to lower your risk of fractures.  Be given hormone replacement therapy (HRT) to treat symptoms of menopause.  Follow these instructions at home:  Alcohol use  Do not drink alcohol if:  Your health care provider tells you not to drink.  You are pregnant, may be pregnant, or are planning to become pregnant.  If you drink alcohol:  Limit how much you have to:  0-1 drink a day.  Know how much alcohol is in your drink. In the [place] one drink equals one 12 oz bottle of beer (355 mL), one 5 oz glass  of wine (148 mL), or one 1½ oz glass of hard liquor (44 mL).  Lifestyle  Do not use any products that contain nicotine or tobacco. These products include cigarettes, chewing tobacco, and vaping devices, such as e-cigarettes. If you need help quitting, ask your health care provider.  Do not use street drugs.  Do not share needles.  Ask your health care provider for help if you need support or information about quitting drugs.  General instructions  Schedule regular health, dental, and eye exams.  Stay current with your vaccines.  Tell your health care provider if:  You often feel depressed.  You have ever been abused or do not feel safe at home.  Summary  Adopting a healthy lifestyle and getting preventive care are important in promoting health and wellness.  Follow your health care provider's instructions about healthy diet, exercising, and getting tested or screened for diseases.  Follow your health care provider's instructions on monitoring your cholesterol and blood pressure.     The following portions of the patient's history were reviewed and updated as appropriate:vital signs and She  has a past medical history of Hypothyroidism, Thyroid cancer, and Thyroid nodule.  She does not have any pertinent problems on file.  She  has a past surgical history that includes  section; Breast Reduction; Reduction mammaplasty; Thyroidectomy, partial (Left); and Thyroidectomy.  Her family history includes Alcohol abuse in her brother and father; COPD in her paternal uncle; Cancer in her brother; Depression in her brother; Hypertension in her mother; Pancreatic cancer in her maternal grandfather; Skin cancer in her maternal grandmother; Thyroid disease in her mother.  She  reports that she quit smoking about 28 years ago. Her smoking use included cigarettes. She started smoking about 31 years ago. She has a 1.5 pack-year smoking history. She has never used smokeless tobacco. She reports that she does not drink alcohol  "and does not use drugs.  Current Outpatient Medications   Medication Sig Dispense Refill    Levonorgestrel (MIRENA) 20 MCG/DAY intrauterine device IUD 1 each by Intrauterine route Every 8 (Eight) Years.      levothyroxine (SYNTHROID, LEVOTHROID) 125 MCG tablet Take 1 tablet by mouth Daily. 90 tablet 3     No current facility-administered medications for this visit.     Current Outpatient Medications on File Prior to Visit   Medication Sig    Levonorgestrel (MIRENA) 20 MCG/DAY intrauterine device IUD 1 each by Intrauterine route Every 8 (Eight) Years.    levothyroxine (SYNTHROID, LEVOTHROID) 125 MCG tablet Take 1 tablet by mouth Daily.     No current facility-administered medications on file prior to visit.     She has no known allergies..    Review of Systems  Pertinent items are noted in HPI.     Physical Exam    Objective     /76   Ht 167.4 cm (65.91\")   Wt 93 kg (205 lb)   Breastfeeding No   BMI 33.18 kg/m²       General:  well developed; well nourished  no acute distress  mentation appropriate   Constitutional: obese and healthy   Skin:  No suspicious lesions seen   Thyroid: normal to inspection and palpation   Lungs:  breathing is unlabored  clear to auscultation bilaterally   Heart:  regular rate and rhythm, S1, S2 normal, no murmur, click, rub or gallop   Breasts:  Examined in supine position  Symmetric without masses or skin dimpling  Nipples normal without inversion, lesions or discharge  There are no palpable axillary nodes  Reduction mammoplasty   Abdomen: soft, non-tender; no masses  no umbilical or inginual hernias are present  no hepato-splenomegaly   Pelvis: Clinical staff was present for exam  External genitalia:  normal appearance of the external genitalia including Bartholin's and Valentine's glands.  :  urethral meatus normal; urethral hypermobility is absent.  Vaginal:  normal pink mucosa without prolapse or lesions.  Cervix:  normal appearance IUD string present - 2 cms in length; pap " performed  Uterus:  normal size, shape and consistency dramatic anteversion suggests adherence to anterior abd wall from remote c section  Adnexa:  normal bimanual exam of the adnexa.   Musculoskeletal: negative   Neuro: normal without focal findings, mental status, speech normal, alert and oriented x3, and HENRRY   Psych: oriented to time, place and person, mood and affect are within normal limits, pt is a good historian; no memory problems were noted       Lab Review   PAP    Imaging  Mammogram report    Assessment & Plan     ASSESSMENT  1. Encounter for annual routine gynecological examination    2. Encounter for screening mammogram for malignant neoplasm of breast        PLAN  Orders Placed This Encounter   Procedures    Mammo Screening Digital Tomosynthesis Bilateral With CAD     Standing Status:   Future     Expected Date:   3/24/2025     Expiration Date:   3/19/2026     Reason for Exam::   screen     Release to patient:   Routine Release [[id number]]     No orders of the defined types were placed in this encounter.        Follow up: 1 year(s)         This note was electronically signed.    Malik Haji MD  March 20, 2025

## 2025-03-21 LAB — REF LAB TEST METHOD: NORMAL

## 2025-06-24 LAB
NCCN CRITERIA FLAG: NORMAL
TYRER CUZICK SCORE: 7.8

## 2025-06-25 ENCOUNTER — HOSPITAL ENCOUNTER (OUTPATIENT)
Dept: MAMMOGRAPHY | Facility: HOSPITAL | Age: 50
Discharge: HOME | End: 2025-06-25
Admitting: OBSTETRICS & GYNECOLOGY
Payer: COMMERCIAL

## 2025-06-25 DIAGNOSIS — Z12.31 ENCOUNTER FOR SCREENING MAMMOGRAM FOR MALIGNANT NEOPLASM OF BREAST: ICD-10-CM

## 2025-06-25 PROCEDURE — 77067 SCR MAMMO BI INCL CAD: CPT

## 2025-06-25 PROCEDURE — 77063 BREAST TOMOSYNTHESIS BI: CPT
